# Patient Record
Sex: MALE | Race: WHITE | NOT HISPANIC OR LATINO
[De-identification: names, ages, dates, MRNs, and addresses within clinical notes are randomized per-mention and may not be internally consistent; named-entity substitution may affect disease eponyms.]

---

## 2017-03-17 ENCOUNTER — APPOINTMENT (OUTPATIENT)
Dept: DERMATOLOGY | Facility: CLINIC | Age: 67
End: 2017-03-17

## 2018-03-31 ENCOUNTER — APPOINTMENT (OUTPATIENT)
Dept: DERMATOLOGY | Facility: CLINIC | Age: 68
End: 2018-03-31
Payer: COMMERCIAL

## 2018-03-31 PROCEDURE — 99214 OFFICE O/P EST MOD 30 MIN: CPT

## 2019-02-25 ENCOUNTER — TRANSCRIPTION ENCOUNTER (OUTPATIENT)
Age: 69
End: 2019-02-25

## 2019-06-15 ENCOUNTER — APPOINTMENT (OUTPATIENT)
Dept: DERMATOLOGY | Facility: CLINIC | Age: 69
End: 2019-06-15
Payer: COMMERCIAL

## 2019-06-15 PROCEDURE — 99214 OFFICE O/P EST MOD 30 MIN: CPT

## 2023-05-25 ENCOUNTER — APPOINTMENT (OUTPATIENT)
Dept: ORTHOPEDIC SURGERY | Facility: CLINIC | Age: 73
End: 2023-05-25
Payer: MEDICARE

## 2023-05-25 VITALS
WEIGHT: 215 LBS | BODY MASS INDEX: 29.12 KG/M2 | SYSTOLIC BLOOD PRESSURE: 124 MMHG | HEIGHT: 72 IN | DIASTOLIC BLOOD PRESSURE: 81 MMHG | HEART RATE: 76 BPM

## 2023-05-25 DIAGNOSIS — Z78.9 OTHER SPECIFIED HEALTH STATUS: ICD-10-CM

## 2023-05-25 DIAGNOSIS — Z87.891 PERSONAL HISTORY OF NICOTINE DEPENDENCE: ICD-10-CM

## 2023-05-25 DIAGNOSIS — Z86.39 PERSONAL HISTORY OF OTHER ENDOCRINE, NUTRITIONAL AND METABOLIC DISEASE: ICD-10-CM

## 2023-05-25 DIAGNOSIS — M25.562 PAIN IN LEFT KNEE: ICD-10-CM

## 2023-05-25 DIAGNOSIS — Z86.79 PERSONAL HISTORY OF OTHER DISEASES OF THE CIRCULATORY SYSTEM: ICD-10-CM

## 2023-05-25 DIAGNOSIS — N41.0 ACUTE PROSTATITIS: ICD-10-CM

## 2023-05-25 DIAGNOSIS — Z87.09 PERSONAL HISTORY OF OTHER DISEASES OF THE RESPIRATORY SYSTEM: ICD-10-CM

## 2023-05-25 PROCEDURE — 73564 X-RAY EXAM KNEE 4 OR MORE: CPT | Mod: LT

## 2023-05-25 PROCEDURE — 99204 OFFICE O/P NEW MOD 45 MIN: CPT | Mod: 25

## 2023-05-25 PROCEDURE — 20610 DRAIN/INJ JOINT/BURSA W/O US: CPT | Mod: LT

## 2023-05-25 RX ORDER — METFORMIN HYDROCHLORIDE 500 MG/1
500 TABLET, COATED ORAL
Refills: 0 | Status: ACTIVE | COMMUNITY

## 2023-05-25 RX ORDER — AMLODIPINE BESYLATE 5 MG/1
5 TABLET ORAL
Refills: 0 | Status: ACTIVE | COMMUNITY

## 2023-05-25 RX ORDER — EZETIMIBE AND SIMVASTATIN 10; 40 MG/1; MG/1
10-40 TABLET ORAL
Refills: 0 | Status: ACTIVE | COMMUNITY

## 2023-05-25 RX ORDER — LOSARTAN POTASSIUM 50 MG/1
50 TABLET, FILM COATED ORAL
Refills: 0 | Status: ACTIVE | COMMUNITY

## 2023-05-25 NOTE — PHYSICAL EXAM
[de-identified] : The patient appears well nourished  and in no apparent distress.  The patient is alert and oriented to person, place, and time.   Affect and mood appear normal. The head is normocephalic and atraumatic.  The eyes reveal normal sclera and extra ocular muscles are intact. The tongue is midline with no apparent lesions.  Skin shows normal turgor with no evidence of eczema or psoriasis.  No respiratory distress noted.  Sensation grossly intact.		  [de-identified] : Exam of the left knee shows a varus alignment which is correctable, intact LCL, 0 to 115 degrees of flexion measured with a goniometer. There is a small effusion. \par 5/5 motor strength bilaterally distally. Sensation intact distally.		  [de-identified] : X-ray: 4 views of the left knee demonstrate bone on bone varus arthritis.

## 2023-05-25 NOTE — ADDENDUM
[FreeTextEntry1] : This note was authored by Tavon Varghese working as a medical scribe for Dr. Alejo Cummings. The note was reviewed, edited, and revised by Dr. Alejo Cummings whom is in agreement with the exam findings, imaging findings, and treatment plan. 05/25/2023

## 2023-05-25 NOTE — CONSULT LETTER
[Dear  ___] : Dear  [unfilled], [Consult Letter:] : I had the pleasure of evaluating your patient, [unfilled]. [Please see my note below.] : Please see my note below. [Consult Closing:] : Thank you very much for allowing me to participate in the care of this patient.  If you have any questions, please do not hesitate to contact me. [Sincerely,] : Sincerely, [FreeTextEntry3] : Alejo Cummings MD\par Chief of Joint Replacement\par Primary [facility] Hip [facility] Knee Replacement \par [facility]\par \par

## 2023-05-25 NOTE — HISTORY OF PRESENT ILLNESS
[de-identified] : Mr. BLAYNE LOWRY is a 73 year old male presenting for evaluation of left knee pain. Pain is localized medially and anteriorly. Patient notes the pain is worse with weightbearing activity and deep flexion. Patient can no longer walk as long as he would like. He has not had injections thus far. He has tried PT and Home exercise without improvement. He is status post right TKR in 2008 with Dr Mcdonald.

## 2023-05-25 NOTE — DISCUSSION/SUMMARY
[de-identified] : BLAYNE LOWRY is a 73 year old male who presents with left knee bone on bone varus arthritis. 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  As such, nonoperative treatment options were discussed. The patient received an intraarticular cortisone injection in the left knee in the office today. The patient will follow up 6 weeks post injection. We discussed that the patient may receive another such injection prior to the weddings if his relief from his injection today wears off by then.  We discussed that after a cortisone injection have to wait 3 months for knee replacement surgery.  He will take this into account when considering whether or not to receive another injection before the wedding.  His initial plan is for surgery possibly in September.

## 2023-05-25 NOTE — PROCEDURE
[de-identified] : Using sterile technique, 2cc of depomedrol 40mg/ml, 4cc of 1% plain lidocaine, and 2 cc 0.25% marcaine was drawn up into a sterile syringe. The left knee was then sterilely prepped with chlorhexidine. Ethyl chloride spray was used to anesthetize the skin and subQ tissue.  The depomedrol/lidocaine/marcaine mixture was then injected into the knee joint in the anterolateral position. The patient tolerated the procedure well without difficulty. The patient was given instructions on the use of ice and anti-inflammatories post injection site soreness.

## 2023-09-19 ENCOUNTER — APPOINTMENT (OUTPATIENT)
Dept: ORTHOPEDIC SURGERY | Facility: CLINIC | Age: 73
End: 2023-09-19
Payer: MEDICARE

## 2023-09-19 VITALS
HEART RATE: 77 BPM | DIASTOLIC BLOOD PRESSURE: 93 MMHG | WEIGHT: 195 LBS | SYSTOLIC BLOOD PRESSURE: 148 MMHG | BODY MASS INDEX: 26.41 KG/M2 | OXYGEN SATURATION: 97 % | HEIGHT: 72 IN

## 2023-09-19 DIAGNOSIS — M17.12 UNILATERAL PRIMARY OSTEOARTHRITIS, LEFT KNEE: ICD-10-CM

## 2023-09-19 PROCEDURE — 73564 X-RAY EXAM KNEE 4 OR MORE: CPT | Mod: 50

## 2023-09-19 PROCEDURE — 99214 OFFICE O/P EST MOD 30 MIN: CPT

## 2023-10-07 ENCOUNTER — APPOINTMENT (OUTPATIENT)
Dept: CT IMAGING | Facility: CLINIC | Age: 73
End: 2023-10-07

## 2023-11-11 ENCOUNTER — APPOINTMENT (OUTPATIENT)
Dept: CT IMAGING | Facility: CLINIC | Age: 73
End: 2023-11-11

## 2023-11-11 ENCOUNTER — OUTPATIENT (OUTPATIENT)
Dept: OUTPATIENT SERVICES | Facility: HOSPITAL | Age: 73
LOS: 1 days | End: 2023-11-11
Payer: MEDICARE

## 2023-11-11 DIAGNOSIS — M17.12 UNILATERAL PRIMARY OSTEOARTHRITIS, LEFT KNEE: ICD-10-CM

## 2023-11-11 PROCEDURE — 73700 CT LOWER EXTREMITY W/O DYE: CPT | Mod: 26,LT

## 2023-11-15 ENCOUNTER — TRANSCRIPTION ENCOUNTER (OUTPATIENT)
Age: 73
End: 2023-11-15

## 2023-11-15 ENCOUNTER — OUTPATIENT (OUTPATIENT)
Dept: OUTPATIENT SERVICES | Facility: HOSPITAL | Age: 73
LOS: 1 days | End: 2023-11-15
Payer: MEDICARE

## 2023-11-15 VITALS
TEMPERATURE: 98 F | HEIGHT: 72 IN | WEIGHT: 201.06 LBS | HEART RATE: 88 BPM | DIASTOLIC BLOOD PRESSURE: 83 MMHG | RESPIRATION RATE: 18 BRPM | SYSTOLIC BLOOD PRESSURE: 141 MMHG | OXYGEN SATURATION: 97 %

## 2023-11-15 DIAGNOSIS — Z98.890 OTHER SPECIFIED POSTPROCEDURAL STATES: Chronic | ICD-10-CM

## 2023-11-15 DIAGNOSIS — Z29.9 ENCOUNTER FOR PROPHYLACTIC MEASURES, UNSPECIFIED: ICD-10-CM

## 2023-11-15 DIAGNOSIS — Z96.651 PRESENCE OF RIGHT ARTIFICIAL KNEE JOINT: Chronic | ICD-10-CM

## 2023-11-15 DIAGNOSIS — M17.12 UNILATERAL PRIMARY OSTEOARTHRITIS, LEFT KNEE: ICD-10-CM

## 2023-11-15 DIAGNOSIS — Z01.818 ENCOUNTER FOR OTHER PREPROCEDURAL EXAMINATION: ICD-10-CM

## 2023-11-15 DIAGNOSIS — Z87.39 PERSONAL HISTORY OF OTHER DISEASES OF THE MUSCULOSKELETAL SYSTEM AND CONNECTIVE TISSUE: ICD-10-CM

## 2023-11-15 DIAGNOSIS — E11.9 TYPE 2 DIABETES MELLITUS WITHOUT COMPLICATIONS: ICD-10-CM

## 2023-11-15 LAB
A1C WITH ESTIMATED AVERAGE GLUCOSE RESULT: 6.2 % — HIGH (ref 4–5.6)
A1C WITH ESTIMATED AVERAGE GLUCOSE RESULT: 6.2 % — HIGH (ref 4–5.6)
ANION GAP SERPL CALC-SCNC: 12 MMOL/L — SIGNIFICANT CHANGE UP (ref 5–17)
ANION GAP SERPL CALC-SCNC: 12 MMOL/L — SIGNIFICANT CHANGE UP (ref 5–17)
BLD GP AB SCN SERPL QL: NEGATIVE — SIGNIFICANT CHANGE UP
BLD GP AB SCN SERPL QL: NEGATIVE — SIGNIFICANT CHANGE UP
BUN SERPL-MCNC: 14 MG/DL — SIGNIFICANT CHANGE UP (ref 7–23)
BUN SERPL-MCNC: 14 MG/DL — SIGNIFICANT CHANGE UP (ref 7–23)
CALCIUM SERPL-MCNC: 9.4 MG/DL — SIGNIFICANT CHANGE UP (ref 8.4–10.5)
CALCIUM SERPL-MCNC: 9.4 MG/DL — SIGNIFICANT CHANGE UP (ref 8.4–10.5)
CHLORIDE SERPL-SCNC: 100 MMOL/L — SIGNIFICANT CHANGE UP (ref 96–108)
CHLORIDE SERPL-SCNC: 100 MMOL/L — SIGNIFICANT CHANGE UP (ref 96–108)
CO2 SERPL-SCNC: 25 MMOL/L — SIGNIFICANT CHANGE UP (ref 22–31)
CO2 SERPL-SCNC: 25 MMOL/L — SIGNIFICANT CHANGE UP (ref 22–31)
CREAT SERPL-MCNC: 0.79 MG/DL — SIGNIFICANT CHANGE UP (ref 0.5–1.3)
CREAT SERPL-MCNC: 0.79 MG/DL — SIGNIFICANT CHANGE UP (ref 0.5–1.3)
EGFR: 93 ML/MIN/1.73M2 — SIGNIFICANT CHANGE UP
EGFR: 93 ML/MIN/1.73M2 — SIGNIFICANT CHANGE UP
ESTIMATED AVERAGE GLUCOSE: 131 MG/DL — HIGH (ref 68–114)
ESTIMATED AVERAGE GLUCOSE: 131 MG/DL — HIGH (ref 68–114)
GLUCOSE SERPL-MCNC: 157 MG/DL — HIGH (ref 70–99)
GLUCOSE SERPL-MCNC: 157 MG/DL — HIGH (ref 70–99)
HCT VFR BLD CALC: 42.5 % — SIGNIFICANT CHANGE UP (ref 39–50)
HCT VFR BLD CALC: 42.5 % — SIGNIFICANT CHANGE UP (ref 39–50)
HGB BLD-MCNC: 14.1 G/DL — SIGNIFICANT CHANGE UP (ref 13–17)
HGB BLD-MCNC: 14.1 G/DL — SIGNIFICANT CHANGE UP (ref 13–17)
MCHC RBC-ENTMCNC: 29.9 PG — SIGNIFICANT CHANGE UP (ref 27–34)
MCHC RBC-ENTMCNC: 29.9 PG — SIGNIFICANT CHANGE UP (ref 27–34)
MCHC RBC-ENTMCNC: 33.2 GM/DL — SIGNIFICANT CHANGE UP (ref 32–36)
MCHC RBC-ENTMCNC: 33.2 GM/DL — SIGNIFICANT CHANGE UP (ref 32–36)
MCV RBC AUTO: 90.2 FL — SIGNIFICANT CHANGE UP (ref 80–100)
MCV RBC AUTO: 90.2 FL — SIGNIFICANT CHANGE UP (ref 80–100)
MRSA PCR RESULT.: SIGNIFICANT CHANGE UP
MRSA PCR RESULT.: SIGNIFICANT CHANGE UP
NRBC # BLD: 0 /100 WBCS — SIGNIFICANT CHANGE UP (ref 0–0)
NRBC # BLD: 0 /100 WBCS — SIGNIFICANT CHANGE UP (ref 0–0)
PLATELET # BLD AUTO: 304 K/UL — SIGNIFICANT CHANGE UP (ref 150–400)
PLATELET # BLD AUTO: 304 K/UL — SIGNIFICANT CHANGE UP (ref 150–400)
POTASSIUM SERPL-MCNC: 4.2 MMOL/L — SIGNIFICANT CHANGE UP (ref 3.5–5.3)
POTASSIUM SERPL-MCNC: 4.2 MMOL/L — SIGNIFICANT CHANGE UP (ref 3.5–5.3)
POTASSIUM SERPL-SCNC: 4.2 MMOL/L — SIGNIFICANT CHANGE UP (ref 3.5–5.3)
POTASSIUM SERPL-SCNC: 4.2 MMOL/L — SIGNIFICANT CHANGE UP (ref 3.5–5.3)
RBC # BLD: 4.71 M/UL — SIGNIFICANT CHANGE UP (ref 4.2–5.8)
RBC # BLD: 4.71 M/UL — SIGNIFICANT CHANGE UP (ref 4.2–5.8)
RBC # FLD: 13.2 % — SIGNIFICANT CHANGE UP (ref 10.3–14.5)
RBC # FLD: 13.2 % — SIGNIFICANT CHANGE UP (ref 10.3–14.5)
RH IG SCN BLD-IMP: POSITIVE — SIGNIFICANT CHANGE UP
RH IG SCN BLD-IMP: POSITIVE — SIGNIFICANT CHANGE UP
S AUREUS DNA NOSE QL NAA+PROBE: DETECTED
S AUREUS DNA NOSE QL NAA+PROBE: DETECTED
SODIUM SERPL-SCNC: 137 MMOL/L — SIGNIFICANT CHANGE UP (ref 135–145)
SODIUM SERPL-SCNC: 137 MMOL/L — SIGNIFICANT CHANGE UP (ref 135–145)
WBC # BLD: 7.13 K/UL — SIGNIFICANT CHANGE UP (ref 3.8–10.5)
WBC # BLD: 7.13 K/UL — SIGNIFICANT CHANGE UP (ref 3.8–10.5)
WBC # FLD AUTO: 7.13 K/UL — SIGNIFICANT CHANGE UP (ref 3.8–10.5)
WBC # FLD AUTO: 7.13 K/UL — SIGNIFICANT CHANGE UP (ref 3.8–10.5)

## 2023-11-15 PROCEDURE — 83036 HEMOGLOBIN GLYCOSYLATED A1C: CPT

## 2023-11-15 PROCEDURE — G0463: CPT

## 2023-11-15 PROCEDURE — 87640 STAPH A DNA AMP PROBE: CPT

## 2023-11-15 PROCEDURE — 85027 COMPLETE CBC AUTOMATED: CPT

## 2023-11-15 PROCEDURE — 86850 RBC ANTIBODY SCREEN: CPT

## 2023-11-15 PROCEDURE — 86901 BLOOD TYPING SEROLOGIC RH(D): CPT

## 2023-11-15 PROCEDURE — 80048 BASIC METABOLIC PNL TOTAL CA: CPT

## 2023-11-15 PROCEDURE — 86900 BLOOD TYPING SEROLOGIC ABO: CPT

## 2023-11-15 PROCEDURE — 87641 MR-STAPH DNA AMP PROBE: CPT

## 2023-11-15 RX ORDER — CHLORHEXIDINE GLUCONATE 213 G/1000ML
1 SOLUTION TOPICAL ONCE
Refills: 0 | Status: COMPLETED | OUTPATIENT
Start: 2023-12-01 | End: 2023-12-01

## 2023-11-15 RX ORDER — TRAMADOL HYDROCHLORIDE 50 MG/1
50 TABLET ORAL ONCE
Refills: 0 | Status: DISCONTINUED | OUTPATIENT
Start: 2023-12-01 | End: 2023-12-01

## 2023-11-15 RX ORDER — CEFAZOLIN SODIUM 1 G
2000 VIAL (EA) INJECTION ONCE
Refills: 0 | Status: COMPLETED | OUTPATIENT
Start: 2023-12-01 | End: 2023-12-01

## 2023-11-15 RX ORDER — SODIUM CHLORIDE 9 MG/ML
3 INJECTION INTRAMUSCULAR; INTRAVENOUS; SUBCUTANEOUS EVERY 8 HOURS
Refills: 0 | Status: DISCONTINUED | OUTPATIENT
Start: 2023-12-01 | End: 2023-12-01

## 2023-11-15 RX ORDER — LIDOCAINE HCL 20 MG/ML
0.2 VIAL (ML) INJECTION ONCE
Refills: 0 | Status: DISCONTINUED | OUTPATIENT
Start: 2023-12-01 | End: 2023-12-01

## 2023-11-15 RX ORDER — PANTOPRAZOLE SODIUM 20 MG/1
40 TABLET, DELAYED RELEASE ORAL ONCE
Refills: 0 | Status: COMPLETED | OUTPATIENT
Start: 2023-12-01 | End: 2023-12-01

## 2023-11-15 NOTE — H&P PST ADULT - PROBLEM SELECTOR PLAN 1
Plan: Left total knee replacement with Leonidas Robot  preop instruction provided in writing and verbally, patient verbalized understanding and teach back Plan: Left total knee replacement with Leonidas Robot  preop instruction provided in writing and verbally, patient verbalized understanding and teach back  scheduled for evaluation with his PCP on 11/21/2023  patient had ct scan of the knee done on 11/11/2023, copy of report on file

## 2023-11-15 NOTE — H&P PST ADULT - ASSESSMENT
CAPRINI SCORE [CLOT updated 18]    AGE RELATED RISK FACTORS                                                       MOBILITY RELATED FACTORS  [ ] Age 41-60 years                                            (1 Point)                    [ ] Bed rest                                                        (1 Point)  [2 ] Age: 61-74 years                                           (2 Points)                  [ ] Plaster cast                                                   (2 Points)  [ ] Age= 75 years                                              (3 Points)                    [ ] Bed bound for more than 72 hours                 (2 Points)    DISEASE RELATED RISK FACTORS                                               GENDER SPECIFIC FACTORS  [ ] Edema in the lower extremities                       (1 Point)              [ ] Pregnancy                                                     (1 Point)  [ ] Varicose veins                                               (1 Point)                     [ ] Post-partum < 6 weeks                                   (1 Point)             [1 ] BMI > 25 Kg/m2                                            (1 Point)                     [ ] Hormonal therapy  or oral contraception          (1 Point)                 [ ] Sepsis (in the previous month)                        (1 Point)               [ ] History of pregnancy complications                 (1 point)  [ ] Pneumonia or serious lung disease                                               [ ] Unexplained or recurrent                     (1 Point)           (in the previous month)                               (1 Point)  [ ] Abnormal pulmonary function test                     (1 Point)                 SURGERY RELATED RISK FACTORS  [ ] Acute myocardial infarction                              (1 Point)               [ ]  Section                                             (1 Point)  [ ] Congestive heart failure (in the previous month)  (1 Point)      [ ] Minor surgery                                                  (1 Point)   [ ] Inflammatory bowel disease                             (1 Point)               [ ] Arthroscopic surgery                                        (2 Points)  [ ] Central venous access                                      (2 Points)                [ ] General surgery lasting more than 45 minutes (2 points)  [ ] Present or previous malignancy                     (2 Points)                [5 ] Elective arthroplasty                                         (5 points)    [ ] Stroke (in the previous month)                          (5 Points)                                                                                                                                                           HEMATOLOGY RELATED FACTORS                                                 TRAUMA RELATED RISK FACTORS  [ ] Prior episodes of VTE                                     (3 Points)                [ ] Fracture of the hip, pelvis, or leg                       (5 Points)  [ ] Positive family history for VTE                         (3 Points)             [ ] Acute spinal cord injury (in the previous month)  (5 Points)  [ ] Prothrombin 62955 A                                     (3 Points)               [ ] Paralysis  (less than 1 month)                             (5 Points)  [ ] Factor V Leiden                                             (3 Points)                  [ ] Multiple Trauma within 1 month                        (5 Points)  [ ] Lupus anticoagulants                                     (3 Points)                                                           [ ] Anticardiolipin antibodies                               (3 Points)                                                       [ ] High homocysteine in the blood                      (3 Points)                                             [ ] Other congenital or acquired thrombophilia      (3 Points)                                                [ ] Heparin induced thrombocytopenia                  (3 Points)                                     Total Score [8 ] CAPRINI SCORE [CLOT updated 18]    AGE RELATED RISK FACTORS                                                       MOBILITY RELATED FACTORS  [ ] Age 41-60 years                                            (1 Point)                    [ ] Bed rest                                                        (1 Point)  [2 ] Age: 61-74 years                                           (2 Points)                  [ ] Plaster cast                                                   (2 Points)  [ ] Age= 75 years                                              (3 Points)                    [ ] Bed bound for more than 72 hours                 (2 Points)    DISEASE RELATED RISK FACTORS                                               GENDER SPECIFIC FACTORS  [ ] Edema in the lower extremities                       (1 Point)              [ ] Pregnancy                                                     (1 Point)  [ ] Varicose veins                                               (1 Point)                     [ ] Post-partum < 6 weeks                                   (1 Point)             [1 ] BMI > 25 Kg/m2                                            (1 Point)                     [ ] Hormonal therapy  or oral contraception          (1 Point)                 [ ] Sepsis (in the previous month)                        (1 Point)               [ ] History of pregnancy complications                 (1 point)  [ ] Pneumonia or serious lung disease                                               [ ] Unexplained or recurrent                     (1 Point)           (in the previous month)                               (1 Point)  [ ] Abnormal pulmonary function test                     (1 Point)                 SURGERY RELATED RISK FACTORS  [ ] Acute myocardial infarction                              (1 Point)               [ ]  Section                                             (1 Point)  [ ] Congestive heart failure (in the previous month)  (1 Point)      [ ] Minor surgery                                                  (1 Point)   [ ] Inflammatory bowel disease                             (1 Point)               [ ] Arthroscopic surgery                                        (2 Points)  [ ] Central venous access                                      (2 Points)                [ ] General surgery lasting more than 45 minutes (2 points)  [ ] Present or previous malignancy                     (2 Points)                [5 ] Elective arthroplasty                                         (5 points)    [ ] Stroke (in the previous month)                          (5 Points)                                                                                                                                                           HEMATOLOGY RELATED FACTORS                                                 TRAUMA RELATED RISK FACTORS  [ ] Prior episodes of VTE                                     (3 Points)                [ ] Fracture of the hip, pelvis, or leg                       (5 Points)  [ ] Positive family history for VTE                         (3 Points)             [ ] Acute spinal cord injury (in the previous month)  (5 Points)  [ ] Prothrombin 61077 A                                     (3 Points)               [ ] Paralysis  (less than 1 month)                             (5 Points)  [ ] Factor V Leiden                                             (3 Points)                  [ ] Multiple Trauma within 1 month                        (5 Points)  [ ] Lupus anticoagulants                                     (3 Points)                                                           [ ] Anticardiolipin antibodies                               (3 Points)                                                       [ ] High homocysteine in the blood                      (3 Points)                                             [ ] Other congenital or acquired thrombophilia      (3 Points)                                                [ ] Heparin induced thrombocytopenia                  (3 Points)                                     Total Score [8 ]      Dasi activities: spin bike 3-4 x/week, walking, light weight lifting  Dasi score: 6.45  Patient has top removable denture

## 2023-11-15 NOTE — H&P PST ADULT - NSICDXPASTSURGICALHX_GEN_ALL_CORE_FT
PAST SURGICAL HISTORY:  S/P total knee arthroplasty, right      PAST SURGICAL HISTORY:  H/O umbilical hernia repair     S/P colonoscopy with polypectomy     S/P total knee arthroplasty, right

## 2023-11-15 NOTE — H&P PST ADULT - HISTORY OF PRESENT ILLNESS
top denture  exerci- light weight spin bike 2-3 times a week , walking  73 year old male with h/o osteoarthritis, c/o worsening left knee pain with weightbearing and flexion affecting his activities of daily living, he is s/p right total knee replacement in 2008, presents for preop evaluation for scheduled left total knee replacement with Leonidas Robot on 12/01/2023. His medical history also significant for T2DM, hypertension, dyslipidemia, mild eczema calf area.

## 2023-11-15 NOTE — H&P PST ADULT - NSANTHOSAYNRD_GEN_A_CORE
No. SANDEE screening performed.  STOP BANG Legend: 0-2 = LOW Risk; 3-4 = INTERMEDIATE Risk; 5-8 = HIGH Risk

## 2023-11-15 NOTE — H&P PST ADULT - REASON FOR ADMISSION
"I am having my left knee replaced."  Goal: "not to have pain on my left knee"  pain tolerance level: 6

## 2023-11-15 NOTE — H&P PST ADULT - NSICDXPASTMEDICALHX_GEN_ALL_CORE_FT
PAST MEDICAL HISTORY:  Dyslipidemia     History of osteoarthritis     Hypertension     T2DM (type 2 diabetes mellitus)      PAST MEDICAL HISTORY:  Dyslipidemia     H/O: eczema     History of osteoarthritis     Hypertension     T2DM (type 2 diabetes mellitus)

## 2023-11-30 ENCOUNTER — TRANSCRIPTION ENCOUNTER (OUTPATIENT)
Age: 73
End: 2023-11-30

## 2023-12-01 ENCOUNTER — APPOINTMENT (OUTPATIENT)
Dept: ORTHOPEDIC SURGERY | Facility: HOSPITAL | Age: 73
End: 2023-12-01

## 2023-12-01 ENCOUNTER — TRANSCRIPTION ENCOUNTER (OUTPATIENT)
Age: 73
End: 2023-12-01

## 2023-12-01 ENCOUNTER — OUTPATIENT (OUTPATIENT)
Dept: INPATIENT UNIT | Facility: HOSPITAL | Age: 73
LOS: 1 days | End: 2023-12-01
Payer: MEDICARE

## 2023-12-01 VITALS
OXYGEN SATURATION: 97 % | WEIGHT: 201.06 LBS | SYSTOLIC BLOOD PRESSURE: 132 MMHG | DIASTOLIC BLOOD PRESSURE: 79 MMHG | HEART RATE: 83 BPM | RESPIRATION RATE: 16 BRPM | HEIGHT: 72 IN | TEMPERATURE: 97 F

## 2023-12-01 DIAGNOSIS — Z98.890 OTHER SPECIFIED POSTPROCEDURAL STATES: Chronic | ICD-10-CM

## 2023-12-01 DIAGNOSIS — M17.12 UNILATERAL PRIMARY OSTEOARTHRITIS, LEFT KNEE: ICD-10-CM

## 2023-12-01 DIAGNOSIS — Z96.651 PRESENCE OF RIGHT ARTIFICIAL KNEE JOINT: Chronic | ICD-10-CM

## 2023-12-01 LAB
GLUCOSE BLDC GLUCOMTR-MCNC: 125 MG/DL — HIGH (ref 70–99)
GLUCOSE BLDC GLUCOMTR-MCNC: 125 MG/DL — HIGH (ref 70–99)
GLUCOSE BLDC GLUCOMTR-MCNC: 131 MG/DL — HIGH (ref 70–99)
GLUCOSE BLDC GLUCOMTR-MCNC: 131 MG/DL — HIGH (ref 70–99)
GLUCOSE BLDC GLUCOMTR-MCNC: 181 MG/DL — HIGH (ref 70–99)
GLUCOSE BLDC GLUCOMTR-MCNC: 181 MG/DL — HIGH (ref 70–99)
GLUCOSE BLDC GLUCOMTR-MCNC: 217 MG/DL — HIGH (ref 70–99)
GLUCOSE BLDC GLUCOMTR-MCNC: 217 MG/DL — HIGH (ref 70–99)

## 2023-12-01 DEVICE — MAKO BONE PIN 4MM X 140MM: Type: IMPLANTABLE DEVICE | Site: LEFT | Status: FUNCTIONAL

## 2023-12-01 DEVICE — BASEPLATE TIB TRIATHLON TRITAN SZ 6: Type: IMPLANTABLE DEVICE | Site: LEFT | Status: FUNCTIONAL

## 2023-12-01 DEVICE — MAKO BONE PIN 4MM X 110MM: Type: IMPLANTABLE DEVICE | Site: LEFT | Status: FUNCTIONAL

## 2023-12-01 DEVICE — FEMORAL CRUCIATE RETAINING SZ 6 LT: Type: IMPLANTABLE DEVICE | Site: LEFT | Status: FUNCTIONAL

## 2023-12-01 DEVICE — PATELLA ASYMM TRIATHLON SZ A 32X10MM: Type: IMPLANTABLE DEVICE | Site: LEFT | Status: FUNCTIONAL

## 2023-12-01 DEVICE — INSERT TIB BEARING CS X3 SZ 6 10MM: Type: IMPLANTABLE DEVICE | Site: LEFT | Status: FUNCTIONAL

## 2023-12-01 RX ORDER — POLYETHYLENE GLYCOL 3350 17 G/17G
17 POWDER, FOR SOLUTION ORAL AT BEDTIME
Refills: 0 | Status: DISCONTINUED | OUTPATIENT
Start: 2023-12-01 | End: 2023-12-02

## 2023-12-01 RX ORDER — DEXTROSE 50 % IN WATER 50 %
25 SYRINGE (ML) INTRAVENOUS ONCE
Refills: 0 | Status: DISCONTINUED | OUTPATIENT
Start: 2023-12-01 | End: 2023-12-02

## 2023-12-01 RX ORDER — SODIUM CHLORIDE 9 MG/ML
1000 INJECTION, SOLUTION INTRAVENOUS
Refills: 0 | Status: DISCONTINUED | OUTPATIENT
Start: 2023-12-01 | End: 2023-12-02

## 2023-12-01 RX ORDER — LOSARTAN POTASSIUM 100 MG/1
1 TABLET, FILM COATED ORAL
Refills: 0 | DISCHARGE

## 2023-12-01 RX ORDER — ACETAMINOPHEN 500 MG
975 TABLET ORAL EVERY 8 HOURS
Refills: 0 | Status: DISCONTINUED | OUTPATIENT
Start: 2023-12-02 | End: 2023-12-02

## 2023-12-01 RX ORDER — AMLODIPINE BESYLATE 2.5 MG/1
5 TABLET ORAL DAILY
Refills: 0 | Status: DISCONTINUED | OUTPATIENT
Start: 2023-12-01 | End: 2023-12-01

## 2023-12-01 RX ORDER — EZETIMIBE 10 MG/1
10 TABLET ORAL AT BEDTIME
Refills: 0 | Status: DISCONTINUED | OUTPATIENT
Start: 2023-12-01 | End: 2023-12-02

## 2023-12-01 RX ORDER — SODIUM CHLORIDE 9 MG/ML
500 INJECTION INTRAMUSCULAR; INTRAVENOUS; SUBCUTANEOUS ONCE
Refills: 0 | Status: COMPLETED | OUTPATIENT
Start: 2023-12-01 | End: 2023-12-02

## 2023-12-01 RX ORDER — TRAMADOL HYDROCHLORIDE 50 MG/1
50 TABLET ORAL EVERY 6 HOURS
Refills: 0 | Status: DISCONTINUED | OUTPATIENT
Start: 2023-12-01 | End: 2023-12-02

## 2023-12-01 RX ORDER — ASPIRIN/CALCIUM CARB/MAGNESIUM 324 MG
325 TABLET ORAL
Refills: 0 | Status: DISCONTINUED | OUTPATIENT
Start: 2023-12-01 | End: 2023-12-02

## 2023-12-01 RX ORDER — ASPIRIN/CALCIUM CARB/MAGNESIUM 324 MG
1 TABLET ORAL
Qty: 0 | Refills: 0 | DISCHARGE
Start: 2023-12-01

## 2023-12-01 RX ORDER — SODIUM CHLORIDE 9 MG/ML
500 INJECTION INTRAMUSCULAR; INTRAVENOUS; SUBCUTANEOUS ONCE
Refills: 0 | Status: COMPLETED | OUTPATIENT
Start: 2023-12-01 | End: 2023-12-01

## 2023-12-01 RX ORDER — LOSARTAN POTASSIUM 100 MG/1
50 TABLET, FILM COATED ORAL DAILY
Refills: 0 | Status: DISCONTINUED | OUTPATIENT
Start: 2023-12-01 | End: 2023-12-01

## 2023-12-01 RX ORDER — METFORMIN HYDROCHLORIDE 850 MG/1
500 TABLET ORAL
Refills: 0 | Status: DISCONTINUED | OUTPATIENT
Start: 2023-12-01 | End: 2023-12-02

## 2023-12-01 RX ORDER — PANTOPRAZOLE SODIUM 20 MG/1
40 TABLET, DELAYED RELEASE ORAL
Refills: 0 | Status: DISCONTINUED | OUTPATIENT
Start: 2023-12-01 | End: 2023-12-02

## 2023-12-01 RX ORDER — POLYETHYLENE GLYCOL 3350 17 G/17G
17 POWDER, FOR SOLUTION ORAL
Qty: 0 | Refills: 0 | DISCHARGE
Start: 2023-12-01

## 2023-12-01 RX ORDER — INSULIN LISPRO 100/ML
VIAL (ML) SUBCUTANEOUS
Refills: 0 | Status: DISCONTINUED | OUTPATIENT
Start: 2023-12-01 | End: 2023-12-02

## 2023-12-01 RX ORDER — INSULIN LISPRO 100/ML
VIAL (ML) SUBCUTANEOUS AT BEDTIME
Refills: 0 | Status: DISCONTINUED | OUTPATIENT
Start: 2023-12-01 | End: 2023-12-02

## 2023-12-01 RX ORDER — EZETIMIBE AND SIMVASTATIN 10; 80 MG/1; MG/1
1 TABLET, FILM COATED ORAL
Refills: 0 | DISCHARGE

## 2023-12-01 RX ORDER — HYDROMORPHONE HYDROCHLORIDE 2 MG/ML
0.5 INJECTION INTRAMUSCULAR; INTRAVENOUS; SUBCUTANEOUS
Refills: 0 | Status: DISCONTINUED | OUTPATIENT
Start: 2023-12-01 | End: 2023-12-01

## 2023-12-01 RX ORDER — GLUCAGON INJECTION, SOLUTION 0.5 MG/.1ML
1 INJECTION, SOLUTION SUBCUTANEOUS ONCE
Refills: 0 | Status: DISCONTINUED | OUTPATIENT
Start: 2023-12-01 | End: 2023-12-02

## 2023-12-01 RX ORDER — SIMVASTATIN 20 MG/1
40 TABLET, FILM COATED ORAL AT BEDTIME
Refills: 0 | Status: DISCONTINUED | OUTPATIENT
Start: 2023-12-01 | End: 2023-12-02

## 2023-12-01 RX ORDER — OXYCODONE HYDROCHLORIDE 5 MG/1
5 TABLET ORAL EVERY 4 HOURS
Refills: 0 | Status: DISCONTINUED | OUTPATIENT
Start: 2023-12-01 | End: 2023-12-02

## 2023-12-01 RX ORDER — AMLODIPINE BESYLATE 2.5 MG/1
5 TABLET ORAL EVERY 24 HOURS
Refills: 0 | Status: DISCONTINUED | OUTPATIENT
Start: 2023-12-02 | End: 2023-12-02

## 2023-12-01 RX ORDER — DEXTROSE 50 % IN WATER 50 %
12.5 SYRINGE (ML) INTRAVENOUS ONCE
Refills: 0 | Status: DISCONTINUED | OUTPATIENT
Start: 2023-12-01 | End: 2023-12-02

## 2023-12-01 RX ORDER — ACETAMINOPHEN 500 MG
1000 TABLET ORAL ONCE
Refills: 0 | Status: COMPLETED | OUTPATIENT
Start: 2023-12-02 | End: 2023-12-02

## 2023-12-01 RX ORDER — ONDANSETRON 8 MG/1
4 TABLET, FILM COATED ORAL EVERY 6 HOURS
Refills: 0 | Status: DISCONTINUED | OUTPATIENT
Start: 2023-12-01 | End: 2023-12-02

## 2023-12-01 RX ORDER — DEXTROSE 50 % IN WATER 50 %
15 SYRINGE (ML) INTRAVENOUS ONCE
Refills: 0 | Status: DISCONTINUED | OUTPATIENT
Start: 2023-12-01 | End: 2023-12-02

## 2023-12-01 RX ORDER — CEFAZOLIN SODIUM 1 G
2000 VIAL (EA) INJECTION EVERY 8 HOURS
Refills: 0 | Status: COMPLETED | OUTPATIENT
Start: 2023-12-01 | End: 2023-12-02

## 2023-12-01 RX ORDER — LOSARTAN POTASSIUM 100 MG/1
50 TABLET, FILM COATED ORAL DAILY
Refills: 0 | Status: DISCONTINUED | OUTPATIENT
Start: 2023-12-02 | End: 2023-12-02

## 2023-12-01 RX ORDER — ACETAMINOPHEN 500 MG
1000 TABLET ORAL ONCE
Refills: 0 | Status: COMPLETED | OUTPATIENT
Start: 2023-12-01 | End: 2023-12-01

## 2023-12-01 RX ORDER — HYDROMORPHONE HYDROCHLORIDE 2 MG/ML
0.5 INJECTION INTRAMUSCULAR; INTRAVENOUS; SUBCUTANEOUS ONCE
Refills: 0 | Status: DISCONTINUED | OUTPATIENT
Start: 2023-12-01 | End: 2023-12-02

## 2023-12-01 RX ORDER — METFORMIN HYDROCHLORIDE 850 MG/1
1 TABLET ORAL
Refills: 0 | DISCHARGE

## 2023-12-01 RX ORDER — MAGNESIUM HYDROXIDE 400 MG/1
30 TABLET, CHEWABLE ORAL DAILY
Refills: 0 | Status: DISCONTINUED | OUTPATIENT
Start: 2023-12-01 | End: 2023-12-02

## 2023-12-01 RX ORDER — ACETAMINOPHEN 500 MG
3 TABLET ORAL
Qty: 0 | Refills: 0 | DISCHARGE
Start: 2023-12-01

## 2023-12-01 RX ORDER — ASPIRIN/CALCIUM CARB/MAGNESIUM 324 MG
1 TABLET ORAL
Refills: 0 | DISCHARGE

## 2023-12-01 RX ORDER — AMLODIPINE BESYLATE 2.5 MG/1
1 TABLET ORAL
Refills: 0 | DISCHARGE

## 2023-12-01 RX ORDER — HYDROMORPHONE HYDROCHLORIDE 2 MG/ML
0.25 INJECTION INTRAMUSCULAR; INTRAVENOUS; SUBCUTANEOUS
Refills: 0 | Status: DISCONTINUED | OUTPATIENT
Start: 2023-12-01 | End: 2023-12-01

## 2023-12-01 RX ORDER — SENNA PLUS 8.6 MG/1
2 TABLET ORAL AT BEDTIME
Refills: 0 | Status: DISCONTINUED | OUTPATIENT
Start: 2023-12-01 | End: 2023-12-02

## 2023-12-01 RX ORDER — ONDANSETRON 8 MG/1
4 TABLET, FILM COATED ORAL ONCE
Refills: 0 | Status: DISCONTINUED | OUTPATIENT
Start: 2023-12-01 | End: 2023-12-01

## 2023-12-01 RX ORDER — OXYCODONE HYDROCHLORIDE 5 MG/1
10 TABLET ORAL EVERY 4 HOURS
Refills: 0 | Status: DISCONTINUED | OUTPATIENT
Start: 2023-12-01 | End: 2023-12-02

## 2023-12-01 RX ADMIN — SODIUM CHLORIDE 500 MILLILITER(S): 9 INJECTION INTRAMUSCULAR; INTRAVENOUS; SUBCUTANEOUS at 16:52

## 2023-12-01 RX ADMIN — OXYCODONE HYDROCHLORIDE 5 MILLIGRAM(S): 5 TABLET ORAL at 20:25

## 2023-12-01 RX ADMIN — SENNA PLUS 2 TABLET(S): 8.6 TABLET ORAL at 22:24

## 2023-12-01 RX ADMIN — CHLORHEXIDINE GLUCONATE 1 APPLICATION(S): 213 SOLUTION TOPICAL at 10:28

## 2023-12-01 RX ADMIN — METFORMIN HYDROCHLORIDE 500 MILLIGRAM(S): 850 TABLET ORAL at 18:01

## 2023-12-01 RX ADMIN — TRAMADOL HYDROCHLORIDE 50 MILLIGRAM(S): 50 TABLET ORAL at 10:27

## 2023-12-01 RX ADMIN — Medication 325 MILLIGRAM(S): at 18:01

## 2023-12-01 RX ADMIN — OXYCODONE HYDROCHLORIDE 5 MILLIGRAM(S): 5 TABLET ORAL at 16:45

## 2023-12-01 RX ADMIN — Medication 1000 MILLIGRAM(S): at 18:30

## 2023-12-01 RX ADMIN — SODIUM CHLORIDE 500 MILLILITER(S): 9 INJECTION INTRAMUSCULAR; INTRAVENOUS; SUBCUTANEOUS at 14:11

## 2023-12-01 RX ADMIN — OXYCODONE HYDROCHLORIDE 5 MILLIGRAM(S): 5 TABLET ORAL at 16:15

## 2023-12-01 RX ADMIN — POLYETHYLENE GLYCOL 3350 17 GRAM(S): 17 POWDER, FOR SOLUTION ORAL at 22:24

## 2023-12-01 RX ADMIN — OXYCODONE HYDROCHLORIDE 5 MILLIGRAM(S): 5 TABLET ORAL at 21:25

## 2023-12-01 RX ADMIN — Medication 400 MILLIGRAM(S): at 18:00

## 2023-12-01 RX ADMIN — PANTOPRAZOLE SODIUM 40 MILLIGRAM(S): 20 TABLET, DELAYED RELEASE ORAL at 10:28

## 2023-12-01 RX ADMIN — Medication 100 MILLIGRAM(S): at 18:16

## 2023-12-01 NOTE — DISCHARGE NOTE PROVIDER - CARE PROVIDERS DIRECT ADDRESSES
[email or web address]

## 2023-12-01 NOTE — PHYSICAL THERAPY INITIAL EVALUATION ADULT - ADDITIONAL COMMENTS
pacu, floor per primary team Pt lives in a pvt home has no steps at entry, once inside 1 step to the family room and kitchen.

## 2023-12-01 NOTE — PHYSICAL THERAPY INITIAL EVALUATION ADULT - PERTINENT HX OF CURRENT PROBLEM, REHAB EVAL
72 y/o M w/ the below PMH here for scheduled L TKA. 74 y/o M w/ the below PMH here for scheduled L TKA.

## 2023-12-01 NOTE — DISCHARGE NOTE PROVIDER - NSDCFUADDINST_GEN_ALL_CORE_FT
1. Keep Aquacel dressing clean and dry   2. ice to affected incision every 4-6 hours x 72 hours   3. You may be weight bearing on your L leg  3a. Range of motion exercises encouraged   4. May shower; protect Aquacel dressing with water-tight seal  i.e. saran wrap; pat dry   5. Continue ECOTRIN 325 mg by mouth 2x / day (at breakfast and at dinner) for a total of (6) WEEKS. then RESUME  your home dose of Baby Aspirin 81mg DAILY  5a.  RESUME your Diabetic Rx  6. Follow up with Dr Mcdonald in 2 WEEKS for dressing REMOVAL, wound check, and staple/suture removal (if applicable)   Please call for an appointment

## 2023-12-01 NOTE — PATIENT PROFILE ADULT - FALL HARM RISK - UNIVERSAL INTERVENTIONS
Bed in lowest position, wheels locked, appropriate side rails in place/Call bell, personal items and telephone in reach/Instruct patient to call for assistance before getting out of bed or chair/Non-slip footwear when patient is out of bed/[place] to call system/Physically safe environment - no spills, clutter or unnecessary equipment/Purposeful Proactive Rounding/Room/bathroom lighting operational, light cord in reach Bed in lowest position, wheels locked, appropriate side rails in place/Call bell, personal items and telephone in reach/Instruct patient to call for assistance before getting out of bed or chair/Non-slip footwear when patient is out of bed/[place] to call system/Physically safe environment - no spills, clutter or unnecessary equipment/Purposeful Proactive Rounding/Room/bathroom lighting operational, light cord in reach Bed in lowest position, wheels locked, appropriate side rails in place/Call bell, personal items and telephone in reach/Instruct patient to call for assistance before getting out of bed or chair/Non-slip footwear when patient is out of bed/[place] to call system/Physically safe environment - no spills, clutter or unnecessary equipment/Purposeful Proactive Rounding/Room/bathroom lighting operational, light cord in reach

## 2023-12-01 NOTE — PHYSICAL THERAPY INITIAL EVALUATION ADULT - ACTIVE RANGE OF MOTION EXAMINATION, REHAB EVAL
L knee 0-85 degrees of flexion/bilateral upper extremity Active ROM was WFL (within functional limits)/bilateral  lower extremity Active ROM was WFL (within functional limits)

## 2023-12-01 NOTE — CHART NOTE - NSCHARTNOTEFT_GEN_A_CORE
POC    Resting without complaints.   Block / Anesthesia still in effect   No Chest Pain, SOB, N/V.    T(C): 36.4 (12-01-23 @ 14:15), Max: 36.4 (12-01-23 @ 14:15)  HR: 66 (12-01-23 @ 14:45) (61 - 83)  BP: 114/64 (12-01-23 @ 14:45) (104/62 - 132/79)  RR: 17 (12-01-23 @ 14:45) (16 - 18)  SpO2: 97% (12-01-23 @ 14:45) (94% - 98%)  Wt(kg): --    Exam:  Alert and [place], No Acute Distress  Pulm: CTAB  Abdomen soft / benign  Ackerman  [n ]   EXT  LLE          Aquacel dressing C/D [ ]        Calves soft       Decreased motor and sensory 2/2 anesthesia / block       digits: warm / well-perfused        cap refill brisk @ 2-3 secs         2+DP  pulses    Xray:---- prosthesis in good alignment        A/P: S/p Left total knee replacement    - serial n/v checks       ** Patient reassured that a full motor and sensory return to baseline is expected with patience, time, and supportive care   -PT/OT-WBAT-  -Chk AM Labs  -DVT PPx  BID  -Pain Control PO/IV Pain Rx  -Continue Current Tx  -Dispo planning: anticipate home      ***See Above  Jonatan BRICE  Orthopedics  B: [id number] POC    Resting without complaints.   Block / Anesthesia still in effect   No Chest Pain, SOB, N/V.    T(C): 36.4 (12-01-23 @ 14:15), Max: 36.4 (12-01-23 @ 14:15)  HR: 66 (12-01-23 @ 14:45) (61 - 83)  BP: 114/64 (12-01-23 @ 14:45) (104/62 - 132/79)  RR: 17 (12-01-23 @ 14:45) (16 - 18)  SpO2: 97% (12-01-23 @ 14:45) (94% - 98%)  Wt(kg): --    Exam:  Alert and [place], No Acute Distress  Pulm: CTAB  Abdomen soft / benign  Ackerman  [n ]   EXT  LLE          Aquacel dressing C/D [ ]        Calves soft       Decreased motor and sensory 2/2 anesthesia / block       digits: warm / well-perfused        cap refill brisk @ 2-3 secs         2+DP  pulses    Xray:---- prosthesis in good alignment        A/P: S/p Left total knee replacement    - serial n/v checks       ** Patient reassured that a full motor and sensory return to baseline is expected with patience, time, and supportive care   -PT/OT-WBAT-  -Chk AM Labs  -DVT PPx  BID  -Pain Control PO/IV Pain Rx  -Continue Current Tx  -Dispo planning: anticipate home      ***See Above  Jonatan BRICE  Orthopedics  B: [phone] POC    Resting without complaints.   Block / Anesthesia still in effect   No Chest Pain, SOB, N/V.    T(C): 36.4 (12-01-23 @ 14:15), Max: 36.4 (12-01-23 @ 14:15)  HR: 66 (12-01-23 @ 14:45) (61 - 83)  BP: 114/64 (12-01-23 @ 14:45) (104/62 - 132/79)  RR: 17 (12-01-23 @ 14:45) (16 - 18)  SpO2: 97% (12-01-23 @ 14:45) (94% - 98%)  Wt(kg): --    Exam:  Alert and [place], No Acute Distress  Pulm: CTAB  Abdomen soft / benign  Ackerman  [n ]   EXT  LLE          Aquacel dressing C/D [ ]        Calves soft       Decreased motor and sensory 2/2 anesthesia / block       digits: warm / well-perfused        cap refill brisk @ 2-3 secs         2+DP  pulses    Xray:---- prosthesis in good alignment        A/P: S/p Left total knee replacement    - serial n/v checks       ** Patient reassured that a full motor and sensory return to baseline is expected with patience, time, and supportive care   -PT/OT-WBAT-  -Chk AM Labs  -DVT PPx  BID  -Pain Control PO/IV Pain Rx  -Continue Current Tx  -Dispo planning: anticipate home      ***See Above  Jonatan BRICE  Orthopedics  B: [phone]

## 2023-12-01 NOTE — DISCHARGE NOTE PROVIDER - PROVIDER TOKENS
PROVIDER:[TOKEN:[[id number]]] PROVIDER:[TOKEN:[[id number]]] PROVIDER:[TOKEN:[[id number]]]

## 2023-12-01 NOTE — DISCHARGE NOTE PROVIDER - NSDCQMPCI_CARD_ALL_CORE
Problem: Patient Care Overview  Goal: Plan of Care Review  Outcome: Ongoing (interventions implemented as appropriate)  Pt on O2 tolerating well. No distress noted at this time.        No

## 2023-12-01 NOTE — DISCHARGE NOTE PROVIDER - CARE PROVIDER_API CALL
Lisandro Mcdonald  Orthopaedic Surgery  [address], [address]  [place] [address]  Phone: [phone]  Fax: [phone]  Follow Up Time:    Lisandro Mcdonald  Orthopaedic Surgery  [address], [address]  [place] [address]  Phone: [phone]  Fax: [phone]  Follow Up Time:    Lisandro Mcdonald  Orthopaedic Surgery  [address], [address]  [place] [address]  Phone: [phone]  Fax: [phone]  Follow Up Time:

## 2023-12-01 NOTE — PRE-OP CHECKLIST - ADVANCE DIRECTIVE ADDRESSED/READDRESSED
Amanda Alcantar (Wife): [phone]/done Amanda Alcantar (Wife): [phone]/done Amanda Alcantar (Wife): [phone]/done

## 2023-12-01 NOTE — PHYSICAL THERAPY INITIAL EVALUATION ADULT - NSPTDMEREC_GEN_A_CORE
Patient will require a rolling walker due to their diagnosis of decreased strength, endurance and balance to help complete MRADL's.

## 2023-12-01 NOTE — DISCHARGE NOTE PROVIDER - NSDCCPTREATMENT_GEN_ALL_CORE_FT
PRINCIPAL PROCEDURE  Procedure: Left total knee replacement  Findings and Treatment: primary osteoarthritis

## 2023-12-01 NOTE — DISCHARGE NOTE PROVIDER - NSDCMRMEDTOKEN_GEN_ALL_CORE_FT
acetaminophen 325 mg oral tablet: 3 tab(s) orally every 8 hours x ONE (1) WEEK, then as needed  amLODIPine 5 mg oral tablet: 1 tab(s) orally once a day  aspirin 325 mg oral delayed release tablet: 1 tab(s) orally 2 times a day x 6 WEEKS Total (blood thinner) then  RESUME Baby Aspirin 81 mg DAILY  ezetimibe-simvastatin 10 mg-40 mg oral tablet: 1 tab(s) orally once a day  losartan 50 mg oral tablet: 1 tab(s) orally once a day  metFORMIN 500 mg oral tablet: 1 tab(s) orally 2 times a day  Multiple Vitamins oral tablet: 1 tab(s) orally  polyethylene glycol 3350 oral powder for reconstitution: 17 gram(s) orally once a day (at bedtime) while on pain medications  vitamin D3  25MCG ([id number] daily:    acetaminophen 325 mg oral tablet: 3 tab(s) orally every 8 hours x ONE (1) WEEK, then as needed  amLODIPine 5 mg oral tablet: 1 tab(s) orally once a day  aspirin 325 mg oral delayed release tablet: 1 tab(s) orally 2 times a day x 6 WEEKS Total (blood thinner) then  RESUME Baby Aspirin 81 mg DAILY  ezetimibe-simvastatin 10 mg-40 mg oral tablet: 1 tab(s) orally once a day  losartan 50 mg oral tablet: 1 tab(s) orally once a day  metFORMIN 500 mg oral tablet: 1 tab(s) orally 2 times a day  Multiple Vitamins oral tablet: 1 tab(s) orally  polyethylene glycol 3350 oral powder for reconstitution: 17 gram(s) orally once a day (at bedtime) while on pain medications  vitamin D3  25MCG ([id number] daily:    acetaminophen 325 mg oral tablet: 3 tab(s) orally every 8 hours x ONE (1) WEEK, then as needed  amLODIPine 5 mg oral tablet: 1 tab(s) orally once a day  aspirin 325 mg oral delayed release tablet: 1 tab(s) orally 2 times a day x 6 WEEKS Total (blood thinner) then  RESUME Baby Aspirin 81 mg DAILY  ezetimibe-simvastatin 10 mg-40 mg oral tablet: 1 tab(s) orally once a day  losartan 50 mg oral tablet: 1 tab(s) orally once a day  metFORMIN 500 mg oral tablet: 1 tab(s) orally 2 times a day  Multiple Vitamins oral tablet: 1 tab(s) orally  polyethylene glycol 3350 oral powder for reconstitution: 17 gram(s) orally once a day (at bedtime) while on pain medications  vitamin D3  25MCG ([id number] daily:    acetaminophen 325 mg oral tablet: 3 tab(s) orally every 8 hours as needed for fever, H/A, mild pain x ONE (1) WEEK, then as needed  amLODIPine 5 mg oral tablet: 1 tab(s) orally once a day  ezetimibe-simvastatin 10 mg-40 mg oral tablet: 1 tab(s) orally once a day  losartan 50 mg oral tablet: 1 tab(s) orally once a day  metFORMIN 500 mg oral tablet: 1 tab(s) orally 2 times a day  Multiple Vitamins oral tablet: 1 tab(s) orally  polyethylene glycol 3350 oral powder for reconstitution: 17 gram(s) orally once a day (at bedtime) while on pain medications  ROLLING WALKER: dx: LEFT KNEE ARTHRITIS   WILVER-99M MDD: 1  traMADol 50 mg oral tablet: 1 tab(s) orally every 6 hours as needed for  moderate pain  vitamin D3  25MCG ([id number] daily:    acetaminophen 325 mg oral tablet: 3 tab(s) orally every 8 hours as needed for fever, H/A, mild pain x ONE (1) WEEK, then as needed  amLODIPine 5 mg oral tablet: 1 tab(s) orally once a day  ezetimibe-simvastatin 10 mg-40 mg oral tablet: 1 tab(s) orally once a day  losartan 50 mg oral tablet: 1 tab(s) orally once a day  metFORMIN 500 mg oral tablet: 1 tab(s) orally 2 times a day  Multiple Vitamins oral tablet: 1 tab(s) orally  polyethylene glycol 3350 oral powder for reconstitution: 17 gram(s) orally once a day (at bedtime) while on pain medications  ROLLING WALKER: dx: LEFT KNEE ARTHRITIS   WILVER-99M MDD: 1  traMADol 50 mg oral tablet: 1 tab(s) orally every 6 hours as needed for  moderate pain  vitamin D3  25MCG ([id number] daily:    acetaminophen 325 mg oral tablet: 3 tab(s) orally every 8 hours as needed for fever, H/A, mild pain x ONE (1) WEEK, then as needed  amLODIPine 5 mg oral tablet: 1 tab(s) orally once a day  ezetimibe-simvastatin 10 mg-40 mg oral tablet: 1 tab(s) orally once a day  losartan 50 mg oral tablet: 1 tab(s) orally once a day  metFORMIN 500 mg oral tablet: 1 tab(s) orally 2 times a day  Multiple Vitamins oral tablet: 1 tab(s) orally  polyethylene glycol 3350 oral powder for reconstitution: 17 gram(s) orally once a day (at bedtime) while on pain medications  ROLLING WALKER: dx: LEFT KNEE ARTHRITIS   WILVER-99M MDD: 1  traMADol 50 mg oral tablet: 1 tab(s) orally every 6 hours as needed for  moderate pain  vitamin D3  25MCG ([id number] daily:    acetaminophen 325 mg oral tablet: 3 tab(s) orally every 8 hours as needed for fever, H/A, mild pain x ONE (1) WEEK, then as needed  amLODIPine 5 mg oral tablet: 1 tab(s) orally once a day  aspirin 325 mg oral delayed release tablet: 1 tab(s) orally 2 times a day x 6 WEEKS Total (blood thinner) then  RESUME Baby Aspirin 81 mg DAILY MDD: 2  CeleBREX 200 mg oral capsule: 1 cap(s) orally 2 times a day as needed for  moderate pain MDD: 2  ezetimibe-simvastatin 10 mg-40 mg oral tablet: 1 tab(s) orally once a day  losartan 50 mg oral tablet: 1 tab(s) orally once a day  metFORMIN 500 mg oral tablet: 1 tab(s) orally 2 times a day  Multiple Vitamins oral tablet: 1 tab(s) orally  omeprazole 40 mg oral delayed release capsule: 1 cap(s) orally once a day MDD: 1  oxyCODONE 5 mg oral tablet: 1 tab(s) orally every 4 hours as needed for  severe pain MDD: 6  polyethylene glycol 3350 oral powder for reconstitution: 17 gram(s) orally once a day (at bedtime) while on pain medications  ROLLING WALKER: dx: LEFT KNEE ARTHRITIS   WILVER-99M MDD: 1  vitamin D3  25MCG ([id number] daily:    acetaminophen 325 mg oral tablet: 3 tab(s) orally every 8 hours as needed for fever, H/A, mild pain x ONE (1) WEEK, then as needed  amLODIPine 5 mg oral tablet: 1 tab(s) orally once a day  aspirin 325 mg oral delayed release tablet: 1 tab(s) orally 2 times a day x 6 WEEKS Total (blood thinner) then  RESUME Baby Aspirin 81 mg DAILY MDD: 2  CeleBREX 200 mg oral capsule: 1 cap(s) orally 2 times a day as needed for  moderate pain MDD: 2  ezetimibe-simvastatin 10 mg-40 mg oral tablet: 1 tab(s) orally once a day  losartan 50 mg oral tablet: 1 tab(s) orally once a day  metFORMIN 500 mg oral tablet: 1 tab(s) orally 2 times a day  Multiple Vitamins oral tablet: 1 tab(s) orally  omeprazole 40 mg oral delayed release capsule: 1 cap(s) orally once a day MDD: 1  oxyCODONE 5 mg oral tablet: 1 tab(s) orally every 4 hours as needed for  severe pain MDD: 6  polyethylene glycol 3350 oral powder for reconstitution: 17 gram(s) orally once a day (at bedtime) while on pain medications  ROLLING WALKER: dx: LEFT KNEE ARTHRITIS   WILVER-99M MDD: 1  vitamin D3  25MCG ([id number] daily:    acetaminophen 325 mg oral tablet: 3 tab(s) orally every 8 hours as needed for fever, H/A, mild pain x ONE (1) WEEK, then as needed  amLODIPine 5 mg oral tablet: 1 tab(s) orally once a day  aspirin 325 mg oral delayed release tablet: 1 tab(s) orally 2 times a day x 6 WEEKS Total (blood thinner) then  RESUME Baby Aspirin 81 mg DAILY MDD: 2  CeleBREX 200 mg oral capsule: 1 cap(s) orally 2 times a day as needed for  moderate pain MDD: 2  ezetimibe-simvastatin 10 mg-40 mg oral tablet: 1 tab(s) orally once a day  losartan 50 mg oral tablet: 1 tab(s) orally once a day  metFORMIN 500 mg oral tablet: 1 tab(s) orally 2 times a day  Multiple Vitamins oral tablet: 1 tab(s) orally  omeprazole 40 mg oral delayed release capsule: 1 cap(s) orally once a day MDD: 1  oxyCODONE 5 mg oral tablet: 1 tab(s) orally every 4 hours as needed for  severe pain MDD: 6  polyethylene glycol 3350 oral powder for reconstitution: 17 gram(s) orally once a day (at bedtime) while on pain medications  ROLLING WALKER: dx: LEFT KNEE ARTHRITIS   WILVER-99M MDD: 1  vitamin D3  25MCG ([id number] daily:

## 2023-12-01 NOTE — DISCHARGE NOTE PROVIDER - NSDCFUSCHEDAPPT_GEN_ALL_CORE_FT
[facility]  ORTHOSURG [address] [facility]  Scheduled Appointment: 12/12/2023     [facility]  ORTHOSURG [address] [facility]  Scheduled Appointment: 12/12/2023     [facility]  ORTHOSURG [address] [facility]  Scheduled Appointment: 12/12/2023

## 2023-12-01 NOTE — PRE-ANESTHESIA EVALUATION ADULT - NSANTHPMHFT_GEN_ALL_CORE
72 y/o M with pmhx HTN, HLD, T2DM presenting for L total knee arthroplasty. 74 y/o M with pmhx HTN, HLD, T2DM presenting for L total knee arthroplasty.    At baseline with good functional status, able to climb flight of stairs, walk 2 or more blocks without cp, sob, or other issue but limited by L knee pain. Denies cp, sob, GERD-like symptoms.    Medical eval 11/22/23: Dr Joya Strauss "Patient is an acceptable candidate for surgery, yes" 72 y/o M with pmhx HTN, HLD, T2DM presenting for L total knee arthroplasty.    At baseline with good functional status, able to climb flight of stairs, walk 2 or more blocks without cp, sob, or other issue but limited by L knee pain. Denies cp, sob, GERD-like symptoms.    Medical eval 11/22/23: Dr Joya Strauss "Patient is an acceptable candidate for surgery, yes"

## 2023-12-01 NOTE — DISCHARGE NOTE PROVIDER - HOSPITAL COURSE
Reason for Admission  "I am having my left knee replaced."  GOC "not to have pain on my left knee"      History of Present Illness    73 year old male with h/o osteoarthritis, c/o worsening left knee pain with weightbearing and flexion affecting his activities of daily living, he is s/p right total knee replacement in 2008, presents for preop evaluation for scheduled left total knee replacement with Leonidas Robot on 12/01/2023. His medical history also significant for T2DM, hypertension, dyslipidemia, mild eczema calf area.     	No Known Allergies:     PAST MEDICAL HISTORY:  Dyslipidemia   H/O: eczema   History of osteoarthritis   Hypertension   T2DM (type 2 diabetes mellitus).     PAST SURGICAL HISTORY:  H/O umbilical hernia repair   S/P colonoscopy with polypectomy   S/P total knee arthroplasty, right.    · 	aspirin 81 mg oral tablet: Last Dose Taken:  , 1 tab(s) orally once a day preventitive  · 	amLODIPine 5 mg oral tablet: Last Dose Taken:  , 1 tab(s) orally once a day  · 	Multiple Vitamins oral tablet: 1 tab(s) orally  · 	metFORMIN 500 mg oral tablet: Last Dose Taken:  , 1 tab(s) orally 2 times a day  · 	losartan 50 mg oral tablet: Last Dose Taken:  , 1 tab(s) orally once a day  · 	ezetimibe-simvastatin 10 mg-40 mg oral tablet: Last Dose Taken:  , 1 tab(s) orally once a day  · 	Vitamin B-complex 1 tab daily:   ·        vitamin D3  25MCG ([id number] daily    Hospital Course:     12/1: Patient admitted and underwent an uncomplicated  L Knee replacement with Dr. Lester tolerated the procedure well, no complications noted, and was transferred to the recovery room in stable condition,     Patient was placed on ASA 325mg BID for DVT ppx, and Protonix for GI protection.   Patient was made  Weight Bearing as tolerated on the operative leg.      Physical & occupational therapy modalities post surgery were performed by PT/OT  including ambulation training, range of motion, ADL's, and transfers.  Patient recommended for  ****  Discharge and Orthopedic Care instructions were delineated in the Discharge Plan and reviewed with the patient.   Patient was deemed stable from an Orthopedic & medical standpoint for discharge *** Reason for Admission  "I am having my left knee replaced."  GOC "not to have pain on my left knee"      History of Present Illness    73 year old male with h/o osteoarthritis, c/o worsening left knee pain with weightbearing and flexion affecting his activities of daily living, he is s/p right total knee replacement in 2008, presents for preop evaluation for scheduled left total knee replacement with Leonidas Robot on 12/01/2023. His medical history also significant for T2DM, hypertension, dyslipidemia, mild eczema calf area.     	No Known Allergies:     PAST MEDICAL HISTORY:  Dyslipidemia   H/O: eczema   History of osteoarthritis   Hypertension   T2DM (type 2 diabetes mellitus).     PAST SURGICAL HISTORY:  H/O umbilical hernia repair   S/P colonoscopy with polypectomy   S/P total knee arthroplasty, right.    · 	aspirin 81 mg oral tablet: Last Dose Taken:  , 1 tab(s) orally once a day preventitive  · 	amLODIPine 5 mg oral tablet: Last Dose Taken:  , 1 tab(s) orally once a day  · 	Multiple Vitamins oral tablet: 1 tab(s) orally  · 	metFORMIN 500 mg oral tablet: Last Dose Taken:  , 1 tab(s) orally 2 times a day  · 	losartan 50 mg oral tablet: Last Dose Taken:  , 1 tab(s) orally once a day  · 	ezetimibe-simvastatin 10 mg-40 mg oral tablet: Last Dose Taken:  , 1 tab(s) orally once a day  · 	Vitamin B-complex 1 tab daily:   ·        vitamin D3  25MCG ([id number] daily    Hospital Course:     12/1: Patient admitted and underwent an uncomplicated  L Knee replacement with Dr. Lester tolerated the procedure well, no complications noted, and was transferred to the recovery room in stable condition,     Patient was placed on ASA 325mg BID for DVT ppx, and Protonix for GI protection.   Patient was made  Weight Bearing as tolerated on the operative leg.      Physical & occupational therapy modalities post surgery were performed by PT/OT  including ambulation training, range of motion, ADL's, and transfers.  Patient recommended for  ****  Discharge and Orthopedic Care instructions were delineated in the Discharge Plan and reviewed with the patient.   Patient was deemed stable from an Orthopedic & medical standpoint for discharge *** Reason for Admission  "I am having my left knee replaced."  GOC "not to have pain on my left knee"      History of Present Illness    73 year old male with h/o osteoarthritis, c/o worsening left knee pain with weightbearing and flexion affecting his activities of daily living, he is s/p right total knee replacement in 2008, presents for preop evaluation for scheduled left total knee replacement with Leonidas Robot on 12/01/2023. His medical history also significant for T2DM, hypertension, dyslipidemia, mild eczema calf area.     	No Known Allergies:     PAST MEDICAL HISTORY:  Dyslipidemia   H/O: eczema   History of osteoarthritis   Hypertension   T2DM (type 2 diabetes mellitus).     PAST SURGICAL HISTORY:  H/O umbilical hernia repair   S/P colonoscopy with polypectomy   S/P total knee arthroplasty, right.    · 	aspirin 81 mg oral tablet: Last Dose Taken:  , 1 tab(s) orally once a day preventitive  · 	amLODIPine 5 mg oral tablet: Last Dose Taken:  , 1 tab(s) orally once a day  · 	Multiple Vitamins oral tablet: 1 tab(s) orally  · 	metFORMIN 500 mg oral tablet: Last Dose Taken:  , 1 tab(s) orally 2 times a day  · 	losartan 50 mg oral tablet: Last Dose Taken:  , 1 tab(s) orally once a day  · 	ezetimibe-simvastatin 10 mg-40 mg oral tablet: Last Dose Taken:  , 1 tab(s) orally once a day  · 	Vitamin B-complex 1 tab daily:   ·        vitamin D3  25MCG ([id number] daily    Hospital Course:     12/1: Patient admitted and underwent an uncomplicated  L Knee replacement with Dr. Lester tolerated the procedure well, no complications noted, and was transferred to the recovery room in stable condition,     Patient was placed on ASA 325mg BID for DVT ppx, and Protonix for GI protection.   Patient was made  Weight Bearing as tolerated on the operative leg.      Physical & occupational therapy modalities post surgery were performed by PT/OT  including ambulation training, range of motion, ADL's, and transfers.  Patient recommended for  ****  Discharge and Orthopedic Care instructions were delineated in the Discharge Plan and reviewed with the patient.   Patient was deemed stable from an Orthopedic & medical standpoint for discharge *** Reason for Admission  "I am having my left knee replaced."  GOC "not to have pain on my left knee"      History of Present Illness    73 year old male with h/o osteoarthritis, c/o worsening left knee pain with weightbearing and flexion affecting his activities of daily living, he is s/p right total knee replacement in 2008, presents for preop evaluation for scheduled left total knee replacement with Leonidas Robot on 12/01/2023. His medical history also significant for T2DM, hypertension, dyslipidemia, mild eczema calf area.     	No Known Allergies:     PAST MEDICAL HISTORY:  Dyslipidemia   H/O: eczema   History of osteoarthritis   Hypertension   T2DM (type 2 diabetes mellitus).     PAST SURGICAL HISTORY:  H/O umbilical hernia repair   S/P colonoscopy with polypectomy   S/P total knee arthroplasty, right.    · 	aspirin 81 mg oral tablet: Last Dose Taken:  , 1 tab(s) orally once a day preventitive  · 	amLODIPine 5 mg oral tablet: Last Dose Taken:  , 1 tab(s) orally once a day  · 	Multiple Vitamins oral tablet: 1 tab(s) orally  · 	metFORMIN 500 mg oral tablet: Last Dose Taken:  , 1 tab(s) orally 2 times a day  · 	losartan 50 mg oral tablet: Last Dose Taken:  , 1 tab(s) orally once a day  · 	ezetimibe-simvastatin 10 mg-40 mg oral tablet: Last Dose Taken:  , 1 tab(s) orally once a day  · 	Vitamin B-complex 1 tab daily:   ·        vitamin D3  25MCG ([id number] daily    Hospital Course:     12/1: Patient admitted and underwent an uncomplicated  L Knee replacement with Dr. Lester tolerated the procedure well, no complications noted, and was transferred to the recovery room in stable condition,     Patient was placed on ASA 325mg BID for DVT ppx, and Protonix for GI protection.   Patient was made  Weight Bearing as tolerated on the operative leg.      Physical & occupational therapy modalities post surgery were performed by PT/OT  including ambulation training, range of motion, ADL's, and transfers.  Patient cleared for discharge home with home physical therapy.  Discharge and Orthopedic Care instructions were delineated in the Discharge Plan and reviewed with the patient.   Patient was deemed stable from an Orthopedic & medical standpoint for discharge on 12/2/2023. Reason for Admission  "I am having my left knee replaced."  GOC "not to have pain on my left knee"      History of Present Illness    73 year old male with h/o osteoarthritis, c/o worsening left knee pain with weightbearing and flexion affecting his activities of daily living, he is s/p right total knee replacement in 2008, presents for preop evaluation for scheduled left total knee replacement with Leonidas Robot on 12/01/2023. His medical history also significant for T2DM, hypertension, dyslipidemia, mild eczema calf area.     	No Known Allergies:     PAST MEDICAL HISTORY:  Dyslipidemia   H/O: eczema   History of osteoarthritis   Hypertension   T2DM (type 2 diabetes mellitus).     PAST SURGICAL HISTORY:  H/O umbilical hernia repair   S/P colonoscopy with polypectomy   S/P total knee arthroplasty, right.    · 	aspirin 81 mg oral tablet: Last Dose Taken:  , 1 tab(s) orally once a day preventitive  · 	amLODIPine 5 mg oral tablet: Last Dose Taken:  , 1 tab(s) orally once a day  · 	Multiple Vitamins oral tablet: 1 tab(s) orally  · 	metFORMIN 500 mg oral tablet: Last Dose Taken:  , 1 tab(s) orally 2 times a day  · 	losartan 50 mg oral tablet: Last Dose Taken:  , 1 tab(s) orally once a day  · 	ezetimibe-simvastatin 10 mg-40 mg oral tablet: Last Dose Taken:  , 1 tab(s) orally once a day  · 	Vitamin B-complex 1 tab daily:   ·        vitamin D3  25MCG ([id number] daily    Hospital Course:     12/1: Patient admitted and underwent an uncomplicated  L Knee replacement with Dr. Lester tolerated the procedure well, no complications noted, and was transferred to the recovery room in stable condition,     Patient was placed on ASA 325mg BID for DVT ppx, and Protonix for GI protection.   Patient was made  Weight Bearing as tolerated on the operative leg.      Physical & occupational therapy modalities post surgery were performed by PT/OT  including ambulation training, range of motion, ADL's, and transfers.  Patient cleared for discharge home with home physical therapy.  Discharge and Orthopedic Care instructions were delineated in the Discharge Plan and reviewed with the patient.   Patient was deemed stable from an Orthopedic & medical standpoint for discharge on 12/2/2023. Reason for Admission  "I am having my left knee replaced."  GOC "not to have pain on my left knee"      History of Present Illness    73 year old male with h/o osteoarthritis, c/o worsening left knee pain with weightbearing and flexion affecting his activities of daily living, he is s/p right total knee replacement in 2008, presents for preop evaluation for scheduled left total knee replacement with Leonidas Robot on 12/01/2023. His medical history also significant for T2DM, hypertension, dyslipidemia, mild eczema calf area.     	No Known Allergies:     PAST MEDICAL HISTORY:  Dyslipidemia   H/O: eczema   History of osteoarthritis   Hypertension   T2DM (type 2 diabetes mellitus).     PAST SURGICAL HISTORY:  H/O umbilical hernia repair   S/P colonoscopy with polypectomy   S/P total knee arthroplasty, right.    · 	aspirin 81 mg oral tablet: Last Dose Taken:  , 1 tab(s) orally once a day preventitive  · 	amLODIPine 5 mg oral tablet: Last Dose Taken:  , 1 tab(s) orally once a day  · 	Multiple Vitamins oral tablet: 1 tab(s) orally  · 	metFORMIN 500 mg oral tablet: Last Dose Taken:  , 1 tab(s) orally 2 times a day  · 	losartan 50 mg oral tablet: Last Dose Taken:  , 1 tab(s) orally once a day  · 	ezetimibe-simvastatin 10 mg-40 mg oral tablet: Last Dose Taken:  , 1 tab(s) orally once a day  · 	Vitamin B-complex 1 tab daily:   ·        vitamin D3  25MCG ([id number] daily    Hospital Course:     12/1: Patient admitted and underwent an uncomplicated  L Knee replacement with Dr. Lester tolerated the procedure well, no complications noted, and was transferred to the recovery room in stable condition,     Patient was placed on ASA 325mg BID for DVT ppx, and Protonix for GI protection.   Patient was made  Weight Bearing as tolerated on the operative leg.      Physical & occupational therapy modalities post surgery were performed by PT/OT  including ambulation training, range of motion, ADL's, and transfers.  Patient cleared for discharge home with home physical therapy.  Discharge and Orthopedic Care instructions were delineated in the Discharge Plan and reviewed with the patient.   Patient was deemed stable from an Orthopedic & medical standpoint for discharge on 12/2/2023.

## 2023-12-01 NOTE — DISCHARGE NOTE PROVIDER - NSDCCPCAREPLAN_GEN_ALL_CORE_FT
PRINCIPAL DISCHARGE DIAGNOSIS  Diagnosis: History of osteoarthritis  Assessment and Plan of Treatment:

## 2023-12-02 ENCOUNTER — TRANSCRIPTION ENCOUNTER (OUTPATIENT)
Age: 73
End: 2023-12-02

## 2023-12-02 VITALS
SYSTOLIC BLOOD PRESSURE: 134 MMHG | OXYGEN SATURATION: 97 % | RESPIRATION RATE: 18 BRPM | DIASTOLIC BLOOD PRESSURE: 74 MMHG | TEMPERATURE: 98 F | HEART RATE: 87 BPM

## 2023-12-02 LAB
ANION GAP SERPL CALC-SCNC: 11 MMOL/L — SIGNIFICANT CHANGE UP (ref 5–17)
ANION GAP SERPL CALC-SCNC: 11 MMOL/L — SIGNIFICANT CHANGE UP (ref 5–17)
BUN SERPL-MCNC: 20 MG/DL — SIGNIFICANT CHANGE UP (ref 7–23)
BUN SERPL-MCNC: 20 MG/DL — SIGNIFICANT CHANGE UP (ref 7–23)
CALCIUM SERPL-MCNC: 8.8 MG/DL — SIGNIFICANT CHANGE UP (ref 8.4–10.5)
CALCIUM SERPL-MCNC: 8.8 MG/DL — SIGNIFICANT CHANGE UP (ref 8.4–10.5)
CHLORIDE SERPL-SCNC: 96 MMOL/L — SIGNIFICANT CHANGE UP (ref 96–108)
CHLORIDE SERPL-SCNC: 96 MMOL/L — SIGNIFICANT CHANGE UP (ref 96–108)
CO2 SERPL-SCNC: 22 MMOL/L — SIGNIFICANT CHANGE UP (ref 22–31)
CO2 SERPL-SCNC: 22 MMOL/L — SIGNIFICANT CHANGE UP (ref 22–31)
CREAT SERPL-MCNC: 0.84 MG/DL — SIGNIFICANT CHANGE UP (ref 0.5–1.3)
CREAT SERPL-MCNC: 0.84 MG/DL — SIGNIFICANT CHANGE UP (ref 0.5–1.3)
EGFR: 92 ML/MIN/1.73M2 — SIGNIFICANT CHANGE UP
EGFR: 92 ML/MIN/1.73M2 — SIGNIFICANT CHANGE UP
GLUCOSE BLDC GLUCOMTR-MCNC: 154 MG/DL — HIGH (ref 70–99)
GLUCOSE BLDC GLUCOMTR-MCNC: 154 MG/DL — HIGH (ref 70–99)
GLUCOSE BLDC GLUCOMTR-MCNC: 157 MG/DL — HIGH (ref 70–99)
GLUCOSE BLDC GLUCOMTR-MCNC: 157 MG/DL — HIGH (ref 70–99)
GLUCOSE SERPL-MCNC: 179 MG/DL — HIGH (ref 70–99)
GLUCOSE SERPL-MCNC: 179 MG/DL — HIGH (ref 70–99)
HCT VFR BLD CALC: 36.4 % — LOW (ref 39–50)
HCT VFR BLD CALC: 36.4 % — LOW (ref 39–50)
HGB BLD-MCNC: 12.3 G/DL — LOW (ref 13–17)
HGB BLD-MCNC: 12.3 G/DL — LOW (ref 13–17)
MCHC RBC-ENTMCNC: 30.4 PG — SIGNIFICANT CHANGE UP (ref 27–34)
MCHC RBC-ENTMCNC: 30.4 PG — SIGNIFICANT CHANGE UP (ref 27–34)
MCHC RBC-ENTMCNC: 33.8 GM/DL — SIGNIFICANT CHANGE UP (ref 32–36)
MCHC RBC-ENTMCNC: 33.8 GM/DL — SIGNIFICANT CHANGE UP (ref 32–36)
MCV RBC AUTO: 90.1 FL — SIGNIFICANT CHANGE UP (ref 80–100)
MCV RBC AUTO: 90.1 FL — SIGNIFICANT CHANGE UP (ref 80–100)
NRBC # BLD: 0 /100 WBCS — SIGNIFICANT CHANGE UP (ref 0–0)
NRBC # BLD: 0 /100 WBCS — SIGNIFICANT CHANGE UP (ref 0–0)
PLATELET # BLD AUTO: 215 K/UL — SIGNIFICANT CHANGE UP (ref 150–400)
PLATELET # BLD AUTO: 215 K/UL — SIGNIFICANT CHANGE UP (ref 150–400)
POTASSIUM SERPL-MCNC: 3.8 MMOL/L — SIGNIFICANT CHANGE UP (ref 3.5–5.3)
POTASSIUM SERPL-MCNC: 3.8 MMOL/L — SIGNIFICANT CHANGE UP (ref 3.5–5.3)
POTASSIUM SERPL-SCNC: 3.8 MMOL/L — SIGNIFICANT CHANGE UP (ref 3.5–5.3)
POTASSIUM SERPL-SCNC: 3.8 MMOL/L — SIGNIFICANT CHANGE UP (ref 3.5–5.3)
RBC # BLD: 4.04 M/UL — LOW (ref 4.2–5.8)
RBC # BLD: 4.04 M/UL — LOW (ref 4.2–5.8)
RBC # FLD: 13.4 % — SIGNIFICANT CHANGE UP (ref 10.3–14.5)
RBC # FLD: 13.4 % — SIGNIFICANT CHANGE UP (ref 10.3–14.5)
SODIUM SERPL-SCNC: 129 MMOL/L — LOW (ref 135–145)
SODIUM SERPL-SCNC: 129 MMOL/L — LOW (ref 135–145)
WBC # BLD: 16.42 K/UL — HIGH (ref 3.8–10.5)
WBC # BLD: 16.42 K/UL — HIGH (ref 3.8–10.5)
WBC # FLD AUTO: 16.42 K/UL — HIGH (ref 3.8–10.5)
WBC # FLD AUTO: 16.42 K/UL — HIGH (ref 3.8–10.5)

## 2023-12-02 PROCEDURE — 82962 GLUCOSE BLOOD TEST: CPT

## 2023-12-02 PROCEDURE — C1713: CPT

## 2023-12-02 PROCEDURE — 97165 OT EVAL LOW COMPLEX 30 MIN: CPT

## 2023-12-02 PROCEDURE — 36415 COLL VENOUS BLD VENIPUNCTURE: CPT

## 2023-12-02 PROCEDURE — S2900: CPT

## 2023-12-02 PROCEDURE — C1776: CPT

## 2023-12-02 PROCEDURE — 97530 THERAPEUTIC ACTIVITIES: CPT

## 2023-12-02 PROCEDURE — 80048 BASIC METABOLIC PNL TOTAL CA: CPT

## 2023-12-02 PROCEDURE — 97116 GAIT TRAINING THERAPY: CPT

## 2023-12-02 PROCEDURE — 97161 PT EVAL LOW COMPLEX 20 MIN: CPT

## 2023-12-02 PROCEDURE — 73560 X-RAY EXAM OF KNEE 1 OR 2: CPT

## 2023-12-02 PROCEDURE — 27447 TOTAL KNEE ARTHROPLASTY: CPT | Mod: LT

## 2023-12-02 PROCEDURE — 85027 COMPLETE CBC AUTOMATED: CPT

## 2023-12-02 RX ORDER — ASPIRIN/CALCIUM CARB/MAGNESIUM 324 MG
1 TABLET ORAL
Qty: 82 | Refills: 0
Start: 2023-12-02 | End: 2024-01-11

## 2023-12-02 RX ORDER — TRAMADOL HYDROCHLORIDE 50 MG/1
1 TABLET ORAL
Qty: 0 | Refills: 0 | DISCHARGE
Start: 2023-12-02

## 2023-12-02 RX ORDER — CELECOXIB 200 MG/1
1 CAPSULE ORAL
Qty: 28 | Refills: 0
Start: 2023-12-02 | End: 2023-12-15

## 2023-12-02 RX ORDER — LANOLIN ALCOHOL/MO/W.PET/CERES
3 CREAM (GRAM) TOPICAL AT BEDTIME
Refills: 0 | Status: DISCONTINUED | OUTPATIENT
Start: 2023-12-02 | End: 2023-12-02

## 2023-12-02 RX ORDER — OXYCODONE HYDROCHLORIDE 5 MG/1
1 TABLET ORAL
Qty: 42 | Refills: 0
Start: 2023-12-02 | End: 2023-12-08

## 2023-12-02 RX ORDER — OMEPRAZOLE 10 MG/1
1 CAPSULE, DELAYED RELEASE ORAL
Qty: 30 | Refills: 0
Start: 2023-12-02 | End: 2023-12-31

## 2023-12-02 RX ADMIN — Medication 100 MILLIGRAM(S): at 02:54

## 2023-12-02 RX ADMIN — OXYCODONE HYDROCHLORIDE 10 MILLIGRAM(S): 5 TABLET ORAL at 09:53

## 2023-12-02 RX ADMIN — Medication 325 MILLIGRAM(S): at 05:10

## 2023-12-02 RX ADMIN — LOSARTAN POTASSIUM 50 MILLIGRAM(S): 100 TABLET, FILM COATED ORAL at 05:10

## 2023-12-02 RX ADMIN — SIMVASTATIN 40 MILLIGRAM(S): 20 TABLET, FILM COATED ORAL at 05:10

## 2023-12-02 RX ADMIN — OXYCODONE HYDROCHLORIDE 10 MILLIGRAM(S): 5 TABLET ORAL at 06:10

## 2023-12-02 RX ADMIN — OXYCODONE HYDROCHLORIDE 5 MILLIGRAM(S): 5 TABLET ORAL at 02:17

## 2023-12-02 RX ADMIN — TRAMADOL HYDROCHLORIDE 50 MILLIGRAM(S): 50 TABLET ORAL at 02:17

## 2023-12-02 RX ADMIN — PANTOPRAZOLE SODIUM 40 MILLIGRAM(S): 20 TABLET, DELAYED RELEASE ORAL at 06:45

## 2023-12-02 RX ADMIN — Medication 400 MILLIGRAM(S): at 04:05

## 2023-12-02 RX ADMIN — AMLODIPINE BESYLATE 5 MILLIGRAM(S): 2.5 TABLET ORAL at 08:11

## 2023-12-02 RX ADMIN — METFORMIN HYDROCHLORIDE 500 MILLIGRAM(S): 850 TABLET ORAL at 08:11

## 2023-12-02 RX ADMIN — SODIUM CHLORIDE 500 MILLILITER(S): 9 INJECTION INTRAMUSCULAR; INTRAVENOUS; SUBCUTANEOUS at 06:38

## 2023-12-02 RX ADMIN — TRAMADOL HYDROCHLORIDE 50 MILLIGRAM(S): 50 TABLET ORAL at 03:15

## 2023-12-02 RX ADMIN — OXYCODONE HYDROCHLORIDE 10 MILLIGRAM(S): 5 TABLET ORAL at 10:55

## 2023-12-02 RX ADMIN — OXYCODONE HYDROCHLORIDE 10 MILLIGRAM(S): 5 TABLET ORAL at 05:10

## 2023-12-02 RX ADMIN — OXYCODONE HYDROCHLORIDE 10 MILLIGRAM(S): 5 TABLET ORAL at 15:30

## 2023-12-02 RX ADMIN — OXYCODONE HYDROCHLORIDE 10 MILLIGRAM(S): 5 TABLET ORAL at 14:24

## 2023-12-02 RX ADMIN — OXYCODONE HYDROCHLORIDE 5 MILLIGRAM(S): 5 TABLET ORAL at 01:12

## 2023-12-02 RX ADMIN — EZETIMIBE 10 MILLIGRAM(S): 10 TABLET ORAL at 05:09

## 2023-12-02 NOTE — OCCUPATIONAL THERAPY INITIAL EVALUATION ADULT - LIVES WITH, PROFILE
Pt lives with wife in private home, 0 steps to enter, 1 step inside, walk in shower. Pt I in ADLs and ambulation prior to admission/spouse

## 2023-12-02 NOTE — DISCHARGE NOTE NURSING/CASE MANAGEMENT/SOCIAL WORK - NSSCNAMETXT_GEN_ALL_CORE
[facility]

## 2023-12-02 NOTE — CHART NOTE - NSCHARTNOTEFT_GEN_A_CORE
Due to patients ongoing issues of weakness 2' Left total knee arthroplasty, patient needs rolling walker in order to attain Patients activities of daily living  Breann Pearce PA-C  Orthopaedic Surgery  Team pager [phone]  [facility] [phone]  [phone] Due to patients ongoing issues of weakness 2' Left total knee arthroplasty, patient needs rolling walker in order to attain Patients activities of daily living  Breann Pearce PA-C  Orthopaedic Surgery  Team pager [phone]  [facility] [phone]  [phone] Due to patients ongoing issues of weakness 2' Left total knee arthroplasty, patient needs rolling walker in order to attain Patients activities of daily living  Breann Pearce PA-C  Orthopaedic Surgery  Team pager [phone]  [facility] [phone]  [phone] Due to patients ongoing issues of weakness 2' Left total knee arthroplasty, patient needs rolling walker in order to attain Patients mobility related activities of daily living  Breann Pearce PA-C  Orthopaedic Surgery  Team pager [phone]  [facility] [phone]  [phone] Due to patients ongoing issues of weakness 2' Left total knee arthroplasty, patient needs rolling walker in order to attain Patients mobility related activities of daily living  Breann Pearce PA-C  Orthopaedic Surgery  Team pager [phone]  [facility] [phone]  [phone] Due to patients ongoing issues of weakness 2' Left total knee arthroplasty, patient needs rolling walker in order to attain Patients mobility related activities of daily living  Breann Pearce PA-C  Orthopaedic Surgery  Team pager [phone]  [facility] [phone]  [phone]

## 2023-12-02 NOTE — PROGRESS NOTE ADULT - SUBJECTIVE AND OBJECTIVE BOX
Patient is a 73y old  Male who presents with a chief complaint of L knee arthritis  Patient s/p L Total Knee Replacement POD#1  Patient comfortable  No complaints    T(C): 36.9 (12-02-23 @ 04:53), Max: 37 (12-02-23 @ 00:36)  HR: 90 (12-02-23 @ 04:53) (61 - 90)  BP: 126/69 (12-02-23 @ 04:53) (103/63 - 168/81)  RR: 18 (12-02-23 @ 04:53) (15 - 18)  SpO2: 94% (12-02-23 @ 04:53) (92% - 99%)    PHYSICAL EXAM:  NAD, Alert  [ Left] Knee: Dressing C/D/I; sensation grossly intact to light touch; (+) DF/PF; (+) Distal Pulses; No Calf tenderness B/L, PAS     LABS:                     12.3   16.42 )-----------( 215      ( 02 Dec 2023 06:46 )             36.4   12-02  129<L>  |  96  |  20  ----------------------------<  179<H>  3.8   |  22  |  0.84  Ca    8.8      02 Dec 2023 06:44

## 2023-12-02 NOTE — OCCUPATIONAL THERAPY INITIAL EVALUATION ADULT - PERTINENT HX OF CURRENT PROBLEM, REHAB EVAL
73 year old male with h/o osteoarthritis, c/o worsening left knee pain with weightbearing and flexion affecting his activities of daily living, he is s/p right total knee replacement in 2008, presents for preop evaluation for scheduled left total knee replacement with Leonidas [employer]ot on 12/01/2023. His medical history also significant for T2DM, hypertension, dyslipidemia, mild eczema calf area. Pt s/p L TKR

## 2023-12-02 NOTE — DISCHARGE NOTE NURSING/CASE MANAGEMENT/SOCIAL WORK - NSDCFUADDAPPT_GEN_ALL_CORE_FT
Follow up with your private internist / PMD in 4-6 weeks re: general checkup (and possible medication adjustment)   Please call for an appointment  ACP

## 2023-12-02 NOTE — DISCHARGE NOTE NURSING/CASE MANAGEMENT/SOCIAL WORK - PATIENT PORTAL LINK FT
You can access the FollowMyHealth Patient Portal offered by [facility] by registering at the following website: [email or web address]. By joining [employer]’s FollowMyHealth portal, you will also be able to view your health information using other applications (apps) compatible with our system. You can access the FollowMyHealth Patient Portal offered by [facility] by registering at the following website: [email or web address]. By joining [employer]’s FollowMyHealth portal, you will also be able to view your health information using other applications (apps) compatible with our system. You can access the FollowMyHealth Patient Portal offered by [facility] by registering at the following website: [email or web address]. By joining [employer]’s FollowMyHealth portal, you will also be able to view your health information using other applications (apps) compatible with our system.

## 2023-12-02 NOTE — DISCHARGE NOTE NURSING/CASE MANAGEMENT/SOCIAL WORK - NSDCPEFALRISK_GEN_ALL_CORE
For information on Fall & Injury Prevention, visit: [email or web address]://www.[facility]/news/fall-prevention-protects-and-maintains-health-and-mobility OR  https://www.[facility]/news/fall-prevention-tips-[email or web address]-injury OR  https://www.cdc.gov/steadi/patient.html For information on Fall & Injury Prevention, visit: https://www.[facility]/news/fall-prevention-protects-and-maintains-health-and-mobility OR  https://www.[facility]/news/fall-prevention-tips-[email or web address]-injury OR  https://www.cdc.gov/steadi/patient.html For information on Fall & Injury Prevention, visit: https://www.[facility]/news/fall-prevention-protects-and-maintains-health-and-mobility OR  https://www.[facility]/news/fall-prevention-tips-to-avoid-injury OR  https://www.cdc.gov/steadi/patient.html

## 2023-12-02 NOTE — PROGRESS NOTE ADULT - ASSESSMENT
74 y/o M s/p left total knee arthroplasty POD#1, physical therapy d/c planning  Breann Pearce PA-C  Orthopaedic Surgery  Team pager [phone]  [facility] [phone]  [phone]   74 y/o M s/p left total knee arthroplasty POD#1, physical therapy d/c planning  Breann Pearce PA-C  Orthopaedic Surgery  Team pager [phone]  [facility] [phone]  [phone]   74 y/o M s/p left total knee arthroplasty POD#1, physical therapy d/c planning  Breann Pearce PA-C  Orthopaedic Surgery  Team pager [phone]  [facility] [phone]  [phone]

## 2023-12-02 NOTE — DISCHARGE NOTE NURSING/CASE MANAGEMENT/SOCIAL WORK - NSDCDMENAME_GEN_ALL_CORE_FT
[facility]

## 2023-12-03 ENCOUNTER — TRANSCRIPTION ENCOUNTER (OUTPATIENT)
Age: 73
End: 2023-12-03

## 2023-12-05 ENCOUNTER — TRANSCRIPTION ENCOUNTER (OUTPATIENT)
Age: 73
End: 2023-12-05

## 2023-12-13 ENCOUNTER — APPOINTMENT (OUTPATIENT)
Dept: ORTHOPEDIC SURGERY | Facility: CLINIC | Age: 73
End: 2023-12-13
Payer: MEDICARE

## 2023-12-13 VITALS
HEART RATE: 99 BPM | HEIGHT: 72 IN | SYSTOLIC BLOOD PRESSURE: 158 MMHG | BODY MASS INDEX: 26.41 KG/M2 | WEIGHT: 195 LBS | DIASTOLIC BLOOD PRESSURE: 94 MMHG

## 2023-12-13 PROBLEM — I10 ESSENTIAL (PRIMARY) HYPERTENSION: Chronic | Status: ACTIVE | Noted: 2023-11-15

## 2023-12-13 PROBLEM — E78.5 HYPERLIPIDEMIA, UNSPECIFIED: Chronic | Status: ACTIVE | Noted: 2023-11-15

## 2023-12-13 PROBLEM — Z87.2 PERSONAL HISTORY OF DISEASES OF THE SKIN AND SUBCUTANEOUS TISSUE: Chronic | Status: ACTIVE | Noted: 2023-11-15

## 2023-12-13 PROBLEM — Z87.39 PERSONAL HISTORY OF OTHER DISEASES OF THE MUSCULOSKELETAL SYSTEM AND CONNECTIVE TISSUE: Chronic | Status: ACTIVE | Noted: 2023-11-15

## 2023-12-13 PROBLEM — E11.9 TYPE 2 DIABETES MELLITUS WITHOUT COMPLICATIONS: Chronic | Status: ACTIVE | Noted: 2023-11-15

## 2023-12-13 PROCEDURE — 73560 X-RAY EXAM OF KNEE 1 OR 2: CPT | Mod: RT

## 2023-12-13 PROCEDURE — 99024 POSTOP FOLLOW-UP VISIT: CPT

## 2023-12-13 PROCEDURE — 73562 X-RAY EXAM OF KNEE 3: CPT | Mod: LT

## 2023-12-13 RX ORDER — CELECOXIB 200 MG/1
200 CAPSULE ORAL
Qty: 60 | Refills: 0 | Status: ACTIVE | COMMUNITY
Start: 2023-12-13 | End: 1900-01-01

## 2023-12-13 RX ORDER — OMEPRAZOLE 40 MG/1
40 CAPSULE, DELAYED RELEASE ORAL TWICE DAILY
Qty: 60 | Refills: 1 | Status: ACTIVE | COMMUNITY
Start: 2023-12-13 | End: 1900-01-01

## 2023-12-16 ENCOUNTER — TRANSCRIPTION ENCOUNTER (OUTPATIENT)
Age: 73
End: 2023-12-16

## 2023-12-20 ENCOUNTER — TRANSCRIPTION ENCOUNTER (OUTPATIENT)
Age: 73
End: 2023-12-20

## 2024-01-24 ENCOUNTER — APPOINTMENT (OUTPATIENT)
Dept: ORTHOPEDIC SURGERY | Facility: CLINIC | Age: 74
End: 2024-01-24
Payer: MEDICARE

## 2024-01-24 ENCOUNTER — NON-APPOINTMENT (OUTPATIENT)
Age: 74
End: 2024-01-24

## 2024-01-24 VITALS
DIASTOLIC BLOOD PRESSURE: 88 MMHG | HEIGHT: 72 IN | BODY MASS INDEX: 26.82 KG/M2 | WEIGHT: 198 LBS | SYSTOLIC BLOOD PRESSURE: 147 MMHG | HEART RATE: 76 BPM

## 2024-01-24 DIAGNOSIS — Z96.651 PRESENCE OF RIGHT ARTIFICIAL KNEE JOINT: ICD-10-CM

## 2024-01-24 PROCEDURE — 99024 POSTOP FOLLOW-UP VISIT: CPT

## 2024-01-24 PROCEDURE — 73562 X-RAY EXAM OF KNEE 3: CPT | Mod: LT

## 2024-01-24 PROCEDURE — 73560 X-RAY EXAM OF KNEE 1 OR 2: CPT | Mod: RT

## 2024-05-22 ENCOUNTER — APPOINTMENT (OUTPATIENT)
Dept: ORTHOPEDIC SURGERY | Facility: CLINIC | Age: 74
End: 2024-05-22
Payer: MEDICARE

## 2024-05-22 DIAGNOSIS — Z96.651 PRESENCE OF RIGHT ARTIFICIAL KNEE JOINT: ICD-10-CM

## 2024-05-22 DIAGNOSIS — Z96.652 PRESENCE OF LEFT ARTIFICIAL KNEE JOINT: ICD-10-CM

## 2024-05-22 PROCEDURE — 99213 OFFICE O/P EST LOW 20 MIN: CPT

## 2024-05-22 PROCEDURE — 73560 X-RAY EXAM OF KNEE 1 OR 2: CPT | Mod: RT

## 2024-05-22 PROCEDURE — 73562 X-RAY EXAM OF KNEE 3: CPT | Mod: LT

## 2024-08-26 RX ORDER — AMOXICILLIN 500 MG/1
500 TABLET, FILM COATED ORAL
Qty: 4 | Refills: 0 | Status: ACTIVE | COMMUNITY
Start: 2024-08-26 | End: 1900-01-01

## 2024-10-11 ENCOUNTER — NON-APPOINTMENT (OUTPATIENT)
Age: 74
End: 2024-10-11

## 2025-03-24 NOTE — H&P PST ADULT - PAIN GOAL
How Severe Are They?: moderate Is This A New Presentation, Or A Follow-Up?: Follow Up Keloids Additional History: Pt is a work in due to concerning change of back lesion 0

## 2025-07-05 ENCOUNTER — NON-APPOINTMENT (OUTPATIENT)
Age: 75
End: 2025-07-05
